# Patient Record
Sex: FEMALE | Race: WHITE | NOT HISPANIC OR LATINO | Employment: OTHER | URBAN - METROPOLITAN AREA
[De-identification: names, ages, dates, MRNs, and addresses within clinical notes are randomized per-mention and may not be internally consistent; named-entity substitution may affect disease eponyms.]

---

## 2019-03-01 ENCOUNTER — TRANSFERRED RECORDS (OUTPATIENT)
Dept: HEALTH INFORMATION MANAGEMENT | Facility: CLINIC | Age: 64
End: 2019-03-01

## 2019-04-24 ENCOUNTER — TRANSFERRED RECORDS (OUTPATIENT)
Dept: HEALTH INFORMATION MANAGEMENT | Facility: CLINIC | Age: 64
End: 2019-04-24

## 2019-12-20 ENCOUNTER — TRANSFERRED RECORDS (OUTPATIENT)
Dept: HEALTH INFORMATION MANAGEMENT | Facility: CLINIC | Age: 64
End: 2019-12-20

## 2019-12-20 LAB
ALT SERPL-CCNC: 21 IU/L (ref 8–45)
AST SERPL-CCNC: 31 IU/L (ref 2–40)
CREAT SERPL-MCNC: 1.09 MG/DL (ref 0.57–1.11)
GFR SERPL CREATININE-BSD FRML MDRD: 51 ML/MIN/1.73M2
GLUCOSE SERPL-MCNC: 75 MG/DL (ref 65–100)
POTASSIUM SERPL-SCNC: 4.8 MMOL/L (ref 3.5–5)
TSH SERPL-ACNC: 0.77 UIU/ML (ref 0.35–4.94)

## 2019-12-27 ENCOUNTER — TRANSFERRED RECORDS (OUTPATIENT)
Dept: HEALTH INFORMATION MANAGEMENT | Facility: CLINIC | Age: 64
End: 2019-12-27

## 2019-12-31 ENCOUNTER — TRANSFERRED RECORDS (OUTPATIENT)
Dept: HEALTH INFORMATION MANAGEMENT | Facility: CLINIC | Age: 64
End: 2019-12-31

## 2020-01-28 ENCOUNTER — TRANSFERRED RECORDS (OUTPATIENT)
Dept: HEALTH INFORMATION MANAGEMENT | Facility: CLINIC | Age: 65
End: 2020-01-28

## 2020-01-30 ENCOUNTER — TRANSFERRED RECORDS (OUTPATIENT)
Dept: HEALTH INFORMATION MANAGEMENT | Facility: CLINIC | Age: 65
End: 2020-01-30

## 2020-10-02 ENCOUNTER — TRANSFERRED RECORDS (OUTPATIENT)
Dept: HEALTH INFORMATION MANAGEMENT | Facility: CLINIC | Age: 65
End: 2020-10-02

## 2020-12-21 LAB
HEP C HIM: NORMAL
TSH SERPL-ACNC: 0.39 UIU/ML (ref 0.35–4.94)

## 2021-01-07 ENCOUNTER — AMBULATORY - HEALTHEAST (OUTPATIENT)
Dept: ADMINISTRATIVE | Facility: CLINIC | Age: 66
End: 2021-01-07

## 2021-01-07 DIAGNOSIS — M79.10 MYALGIA: ICD-10-CM

## 2021-01-15 ENCOUNTER — TRANSFERRED RECORDS (OUTPATIENT)
Dept: HEALTH INFORMATION MANAGEMENT | Facility: CLINIC | Age: 66
End: 2021-01-15

## 2021-01-25 ENCOUNTER — TRANSCRIBE ORDERS (OUTPATIENT)
Dept: OTHER | Age: 66
End: 2021-01-25

## 2021-01-25 DIAGNOSIS — M79.10 MYALGIA: Primary | ICD-10-CM

## 2021-01-25 DIAGNOSIS — H04.123 DRY EYES: ICD-10-CM

## 2021-01-25 DIAGNOSIS — R68.2 DRY MOUTH: ICD-10-CM

## 2021-02-06 ENCOUNTER — HEALTH MAINTENANCE LETTER (OUTPATIENT)
Age: 66
End: 2021-02-06

## 2021-03-03 ENCOUNTER — VIRTUAL VISIT (OUTPATIENT)
Dept: RHEUMATOLOGY | Facility: CLINIC | Age: 66
End: 2021-03-03
Payer: MEDICARE

## 2021-03-03 DIAGNOSIS — M15.0 PRIMARY OSTEOARTHRITIS INVOLVING MULTIPLE JOINTS: ICD-10-CM

## 2021-03-03 DIAGNOSIS — M19.041 PRIMARY OSTEOARTHRITIS OF BOTH HANDS: ICD-10-CM

## 2021-03-03 DIAGNOSIS — R76.8 CYCLIC CITRULLINATED PEPTIDE (CCP) ANTIBODY POSITIVE: ICD-10-CM

## 2021-03-03 DIAGNOSIS — M19.042 PRIMARY OSTEOARTHRITIS OF BOTH HANDS: ICD-10-CM

## 2021-03-03 DIAGNOSIS — M16.0 PRIMARY OSTEOARTHRITIS OF BOTH HIPS: ICD-10-CM

## 2021-03-03 DIAGNOSIS — M35.00 SICCA SYNDROME (H): Primary | ICD-10-CM

## 2021-03-03 PROCEDURE — 99204 OFFICE O/P NEW MOD 45 MIN: CPT | Mod: 95 | Performed by: STUDENT IN AN ORGANIZED HEALTH CARE EDUCATION/TRAINING PROGRAM

## 2021-03-03 RX ORDER — LEVOTHYROXINE SODIUM 112 UG/1
112 TABLET ORAL
COMMUNITY
Start: 2020-12-23

## 2021-03-03 RX ORDER — CEVIMELINE HYDROCHLORIDE 30 MG/1
30 CAPSULE ORAL 3 TIMES DAILY
Qty: 90 CAPSULE | Refills: 0 | Status: SHIPPED | OUTPATIENT
Start: 2021-03-03 | End: 2021-04-19

## 2021-03-03 NOTE — PATIENT INSTRUCTIONS
Your joint pains seem consistent with primary osteoarthritis.  Likelihood of rheumatoid arthritis is low.  Due to positive anti-CCP antibody I will get x-rays of bilateral hand, wrist and feet.    Due to worsening hip pain x-ray of bilateral hips will be done.  You will most likely need to be seen by orthopedic specialist.    For dry mouth symptoms I will put ENT referral for minor salivary gland lip biopsy    You can try cevimeline 30mg capsule 3-4 times a day on as-needed basis for dry mouth.    Follow-up in 8 weeks.

## 2021-03-03 NOTE — PROGRESS NOTES
Nicolasa is a 65 year old who is being evaluated via a billable video visit.      How would you like to obtain your AVS? MyChart  If the video visit is dropped, the invitation should be resent by: Text to cell phone: [phone]  Will anyone else be joining your video visit? Jenny       Ximena Nguyensor [facility]    Video Start Time: 9:45 AM           Active Problem List:     Patient Active Problem List    Diagnosis Date Noted     Primary osteoarthritis involving multiple joints      Priority: Medium            History of Present Illness:   Nicolasa Vogel is a 65 year old female with PMH of sicca symptoms, Osteoarthritis, meningioma, hypothyroidism evaluated via a billable virtual visit in consultation at request of Dr. Josselin Cruz for evaluation of joint pains and sicca symptoms.     She has pain in her joints for more than 10 years.  Sometimes they come as waves when she has more pain in the joints for couple weeks and then it improves.  Mostly involves hips, low back, thighs.  She was evaluated by rheumatologist Dr. Fung in 2014 and then by Dr. Justice in 2020.  Her autoimmune work-up in the past has shown negative SARAY, JORGE LUIS panel, rheumatoid serologies.  Last year her anti-CCP antibody was elevated.  But as per previous rheumatologist's note no evidence of synovitis was noted over her joints.  Mostly degenerative changes were noted.  She has done physical therapy for hip and low back pain.  She tries to be active and walk 4 to 5 miles a day.    Right index finger DIP joint, thumb IP joint hurt. She does not knit any more. Heberden nodes present over the DIP joint. Right wrist ROM is limited. No pain in her elbows, shoulders, knees, ankles. Pain in her big toes, lower back.     Her other main concern is sicca symptoms.  She has dry eyes dry mouth since 2013 and has developed tooth decay secondary to dry mouth.  She follows up with a dentist and ophthalmologist to manage sicca symptoms. Dry eyes have become worse since last  fall. When she wake up she put drops in her eyes.  Her PCP wonder about Sjogren's and RA.     She has noticed that her scalp hurts when washing her hair. She has more bloating, constipation. She has chronic allergies. Her sister has Raynaud's, niece has Lupus. Dad had wegener's    No history of psoriasis, ulcerative colitis or chron's disease. No h/o iritis, enthesitis, finger or toe swelling like dactylitis, plantar fascitis or heel pain. Denies any raynauds, malar rash, photosensitivity, recurrent mouth/genital ulcers, pleuritic chest pains, recurrent sinusitis/rhinitis, swallowing difficulty, hearing or visual changes recently.  No h/o arterial/venous thrombosis in the past. Denies any tick bite, recent GI/ infection.             Review of Systems:     Review Of Systems  Constitutional: denies fever, chills, night sweats and weight loss.  Skin: No skin rash.  Eyes: + dryness or irritation in eyes. No episode of eye inflammation or redness.   Ears/Nose/Throat: no recurrent sinus infections.  Respiratory: No shortness of breath, dyspnea on exertion, cough, or hemoptysis  Cardiovascular: no chest pain or palpitations.  Gastrointestinal: no nausea, vomiting, abdominal pain.  Normal bowel movements.  Genitourinary: no dysuria, frequency  or hematuria.  Musculoskeletal: as in HPI  Neurologic: no numbness, tingling.  Psychiatric: no mood disorders.  Hematologic/Lymphatic/Immunologic: no history of easy bruising, petechia or purpura.  No abnormal bleeding.   Endocrine: +  h/o thyroid disease or Diabetes.                  Past Medical History:     Past Medical History:   Diagnosis Date     Primary osteoarthritis involving multiple joints      Sicca syndrome (H)      Past Surgical History:   Procedure Laterality Date     CHOLECYSTECTOMY       HYSTERECTOMY TOTAL ABDOMINAL              Social History:     Social History     Occupational History     Not on file   Tobacco Use     Smoking status: Not on file   Substance and  Sexual Activity     Alcohol use: Not on file     Drug use: Not on file     Sexual activity: Not on file            Family History:     Family History   Problem Relation Age of Onset     Vasculitis Father      Raynaud syndrome Sister      Lupus Niece             Allergies:     Allergies   Allergen Reactions     Diclofenac Shortness Of Breath     After using gel on joints of hands she became SOB with lightheadedness     Penicillins Hives     Tetracycline Hives            Medications:     Current Outpatient Medications   Medication Sig Dispense Refill     Calcium-Magnesium-Vitamin D (CALCIUM 1200+D3 PO)        cevimeline (EVOXAC) 30 MG capsule Take 1 capsule (30 mg) by mouth 3 times daily 90 capsule 0     levothyroxine (SYNTHROID) 112 MCG tablet Take 112 mcg by mouth       MAGNESIUM CITRATE PO        Multiple Vitamin (MULTIVITAMIN ADULT PO)        Omega-3 Fatty Acids (FISH OIL) 875 MG CAPS        Turmeric (QC TUMERIC COMPLEX PO)               Physical Exam:   Vitals not taken.   Wt Readings from Last 4 Encounters:   No data found for Wt       Constitutional: Moderately built, appearing stated age; cooperative  MS: Right index finger DIP joint, thumb IP joint hurt. Heberden nodes over the DIP joint. R index, middle PIP giovana node. Right wrist, CMC joint hurt. Right wrist ROM is limited. No pain in her elbows, shoulders, knees, ankles. Pain in her big toes, lower back.     Psych: nl judgement, orientation, memory, affect.         Data:     No results found for any visits on 03/03/21.    No results for input(s): WBC, RBC, HGB, HCT, MCV, RDW, PLT, CCPABG, ALBUMIN, CRP, BUN, CREAT in the last 52521 hours.    Invalid input(s): MCT,  AST,  ALT,  ESR   Recent Labs   Lab Test 12/21/20 12/20/19   TSH 0.39 0.77     Hemoglobin   Date Value Ref Range Status   03/24/2021 14.5 11.7 - 15.7 g/dL Final     Sed Rate   Date Value Ref Range Status   03/24/2021 27 0 - 30 mm/h Final     CRP Inflammation   Date Value Ref Range Status    03/24/2021 3.4 0.0 - 8.0 mg/L Final     AST   Date Value Ref Range Status   03/24/2021 32 0 - 45 U/L Final   12/20/2019 31 2 - 40 IU/L Final     Albumin   Date Value Ref Range Status   03/24/2021 4.0 3.4 - 5.0 g/dL Final     ALT   Date Value Ref Range Status   03/24/2021 47 0 - 50 U/L Final   12/20/2019 21 8 - 45 IU/L Final     Rheumatoid Factor   Date Value Ref Range Status   03/24/2021 <7 <12 IU/mL Final     Recent Labs   Lab Test 03/24/21  1016 12/21/20 12/20/19   WBC 8.5  --   --    HGB 14.5  --   --    HCT 44.6  --   --    MCV 91  --   --      --   --    TSH  --  0.39 0.77   AST 32  --  31   ALT 47  --  21     Out side labs :     MRI L spine 12/2019  IMPRESSION:  1. L4-L5 right foraminal/extraforaminal disc extrusion displaces and likely  impinges the exiting right L4 spinal nerve.  2. Multilevel lumbar spondylosis.  3. Moderate right and mild to moderate left neural foraminal stenosis at L4-L5.  4. Mild to moderate spinal canal stenosis at L4-L5. Mild spinal canal stenosis  at L3-L4.      EXAM: XR PELVIS 1 VIEW AP AND HIP 2 VIEW BILATERAL    INDICATION: Chronic Midline Low Back Pain With Right-sided Sciatica Chronic  Midline Low Back Pain With Right-sided Sciatica  COMPARISON: None.    FINDINGS: Mild degenerative osteoarthritis of the right femoral acetabular joint  with formation of small marginal osteophytes. Left femoral acetabular joint is  intact. No acute fracture or dislocation.  Reviewed Rheumatology lab flowsheet    Assessment     Sicca symptoms  Polyarticular osteoarthritis  History of positive anti-CCP antibody - 61 - 12/2020  Negative rheumatoid factor, normal ESR, CRP, negative SARAY    Polyarticular arthritis: She reports pain in her joints mostly in lower back, hips, wrists from last 10 years.  MRI of the lumbar spine in 12/19 has shown multilevel degenerative disc disease.  X-ray of her hands, hips in the past has shown mostly degenerative changes.  On virtual exam she has evidence of  Heberden nodes over PIP DIP joints and reports tenderness over the first CMC joint.  Right wrist range of motion is limited.  Her virtual exam and clinical history is consistent with degenerative arthritis.  Likelihood of rheumatoid arthritis is low.  But due to positive anti-CCP antibody last year I will repeat her rheumatoid serologies and get x-rays of bilateral hand, wrist and bilateral hips.    If mostly degenerative arthritis is seen on the imaging then she need to manage it with as needed Tylenol arthritis or NSAIDs, physical therapy, strength training.    Severe sicca symptoms:  she has sicca symptoms since 2013.  Gradually getting worse.  She has developed tooth decay dry mouth.  Her Sjogren serologies in the past have been negative. I will repeat SARAY, JORGE LUIS panel.  Due to severity of sicca symptoms I will get minor salivary gland lip biopsy to confirm seronegative Sjogren's.  I will start her on cholinergic agonists like cevimeline to help with sicca symptoms.  Side effects of cevimeline including increased flushing, sweating, bloating, cramping, blurry vision discussed with the patient.    Plan     Your joint pains seem consistent with primary osteoarthritis.  Likelihood of rheumatoid arthritis is low.  Due to positive anti-CCP antibody I will get x-rays of bilateral hand, wrist and feet.    Due to worsening hip pain x-ray of bilateral hips will be done.  You will most likely need to be seen by orthopedic specialist.    For dry mouth symptoms I will put ENT referral for minor salivary gland lip biopsy    You can try cevimeline 30mg capsule 3-4 times a day on as-needed basis for dry mouth.    Follow-up in 8 weeks.      Video-Visit Details    Type of service:  Video Visit    Video End Time: 10:30 AM     Originating Location (pt. Location): Home    Distant Location (provider location):  [facility]     Platform used for Video Visit: Bulb

## 2021-03-24 ENCOUNTER — ANCILLARY PROCEDURE (OUTPATIENT)
Dept: GENERAL RADIOLOGY | Facility: CLINIC | Age: 66
End: 2021-03-24
Attending: STUDENT IN AN ORGANIZED HEALTH CARE EDUCATION/TRAINING PROGRAM
Payer: MEDICARE

## 2021-03-24 DIAGNOSIS — M35.00 SICCA SYNDROME (H): ICD-10-CM

## 2021-03-24 DIAGNOSIS — M19.041 PRIMARY OSTEOARTHRITIS OF BOTH HANDS: ICD-10-CM

## 2021-03-24 DIAGNOSIS — M16.0 PRIMARY OSTEOARTHRITIS OF BOTH HIPS: ICD-10-CM

## 2021-03-24 DIAGNOSIS — M19.042 PRIMARY OSTEOARTHRITIS OF BOTH HANDS: ICD-10-CM

## 2021-03-24 DIAGNOSIS — R76.8 CYCLIC CITRULLINATED PEPTIDE (CCP) ANTIBODY POSITIVE: ICD-10-CM

## 2021-03-24 LAB
ALBUMIN SERPL-MCNC: 4 G/DL (ref 3.4–5)
ALBUMIN UR-MCNC: NEGATIVE MG/DL
ALT SERPL W P-5'-P-CCNC: 47 U/L (ref 0–50)
AMORPH CRY #/AREA URNS HPF: ABNORMAL /HPF
APPEARANCE UR: CLEAR
AST SERPL W P-5'-P-CCNC: 32 U/L (ref 0–45)
BACTERIA #/AREA URNS HPF: ABNORMAL /HPF
BILIRUB UR QL STRIP: NEGATIVE
COLOR UR AUTO: YELLOW
CREAT SERPL-MCNC: 0.84 MG/DL (ref 0.52–1.04)
CREAT UR-MCNC: 69 MG/DL
CRP SERPL-MCNC: 3.4 MG/L (ref 0–8)
ERYTHROCYTE [DISTWIDTH] IN BLOOD BY AUTOMATED COUNT: 13.1 % (ref 10–15)
ERYTHROCYTE [SEDIMENTATION RATE] IN BLOOD BY WESTERGREN METHOD: 27 MM/H (ref 0–30)
GFR SERPL CREATININE-BSD FRML MDRD: 72 ML/MIN/{1.73_M2}
GLUCOSE UR STRIP-MCNC: NEGATIVE MG/DL
HCT VFR BLD AUTO: 44.6 % (ref 35–47)
HGB BLD-MCNC: 14.5 G/DL (ref 11.7–15.7)
HGB UR QL STRIP: NEGATIVE
KETONES UR STRIP-MCNC: NEGATIVE MG/DL
LEUKOCYTE ESTERASE UR QL STRIP: NEGATIVE
MCH RBC QN AUTO: 29.4 PG (ref 26.5–33)
MCHC RBC AUTO-ENTMCNC: 32.5 G/DL (ref 31.5–36.5)
MCV RBC AUTO: 91 FL (ref 78–100)
NITRATE UR QL: NEGATIVE
NON-SQ EPI CELLS #/AREA URNS LPF: ABNORMAL /LPF
PH UR STRIP: 6.5 PH (ref 5–7)
PLATELET # BLD AUTO: 324 10E9/L (ref 150–450)
PROT UR-MCNC: 0.1 G/L
PROT/CREAT 24H UR: 0.14 G/G CR (ref 0–0.2)
RBC # BLD AUTO: 4.93 10E12/L (ref 3.8–5.2)
RBC #/AREA URNS AUTO: ABNORMAL /HPF
RHEUMATOID FACT SER NEPH-ACNC: <7 IU/ML (ref 0–20)
SOURCE: ABNORMAL
SP GR UR STRIP: 1.02 (ref 1–1.03)
UROBILINOGEN UR STRIP-MCNC: NORMAL MG/DL (ref 0–2)
WBC # BLD AUTO: 8.5 10E9/L (ref 4–11)
WBC #/AREA URNS AUTO: ABNORMAL /HPF

## 2021-03-24 PROCEDURE — 86038 ANTINUCLEAR ANTIBODIES: CPT | Performed by: STUDENT IN AN ORGANIZED HEALTH CARE EDUCATION/TRAINING PROGRAM

## 2021-03-24 PROCEDURE — 84450 TRANSFERASE (AST) (SGOT): CPT | Performed by: STUDENT IN AN ORGANIZED HEALTH CARE EDUCATION/TRAINING PROGRAM

## 2021-03-24 PROCEDURE — 85652 RBC SED RATE AUTOMATED: CPT | Performed by: STUDENT IN AN ORGANIZED HEALTH CARE EDUCATION/TRAINING PROGRAM

## 2021-03-24 PROCEDURE — 86431 RHEUMATOID FACTOR QUANT: CPT | Performed by: STUDENT IN AN ORGANIZED HEALTH CARE EDUCATION/TRAINING PROGRAM

## 2021-03-24 PROCEDURE — 81001 URINALYSIS AUTO W/SCOPE: CPT | Performed by: STUDENT IN AN ORGANIZED HEALTH CARE EDUCATION/TRAINING PROGRAM

## 2021-03-24 PROCEDURE — 73110 X-RAY EXAM OF WRIST: CPT | Mod: RT | Performed by: RADIOLOGY

## 2021-03-24 PROCEDURE — 84156 ASSAY OF PROTEIN URINE: CPT | Performed by: STUDENT IN AN ORGANIZED HEALTH CARE EDUCATION/TRAINING PROGRAM

## 2021-03-24 PROCEDURE — 86140 C-REACTIVE PROTEIN: CPT | Performed by: STUDENT IN AN ORGANIZED HEALTH CARE EDUCATION/TRAINING PROGRAM

## 2021-03-24 PROCEDURE — 86235 NUCLEAR ANTIGEN ANTIBODY: CPT | Performed by: STUDENT IN AN ORGANIZED HEALTH CARE EDUCATION/TRAINING PROGRAM

## 2021-03-24 PROCEDURE — 85027 COMPLETE CBC AUTOMATED: CPT | Performed by: STUDENT IN AN ORGANIZED HEALTH CARE EDUCATION/TRAINING PROGRAM

## 2021-03-24 PROCEDURE — 82040 ASSAY OF SERUM ALBUMIN: CPT | Performed by: STUDENT IN AN ORGANIZED HEALTH CARE EDUCATION/TRAINING PROGRAM

## 2021-03-24 PROCEDURE — 86200 CCP ANTIBODY: CPT | Performed by: STUDENT IN AN ORGANIZED HEALTH CARE EDUCATION/TRAINING PROGRAM

## 2021-03-24 PROCEDURE — 36415 COLL VENOUS BLD VENIPUNCTURE: CPT | Performed by: STUDENT IN AN ORGANIZED HEALTH CARE EDUCATION/TRAINING PROGRAM

## 2021-03-24 PROCEDURE — 72170 X-RAY EXAM OF PELVIS: CPT | Performed by: RADIOLOGY

## 2021-03-24 PROCEDURE — 73130 X-RAY EXAM OF HAND: CPT | Mod: LT | Performed by: RADIOLOGY

## 2021-03-24 PROCEDURE — 84460 ALANINE AMINO (ALT) (SGPT): CPT | Performed by: STUDENT IN AN ORGANIZED HEALTH CARE EDUCATION/TRAINING PROGRAM

## 2021-03-24 PROCEDURE — 82565 ASSAY OF CREATININE: CPT | Performed by: STUDENT IN AN ORGANIZED HEALTH CARE EDUCATION/TRAINING PROGRAM

## 2021-03-25 LAB
ANA SER QL IF: NEGATIVE
CCP AB SER IA-ACNC: 1 U/ML
ENA RNP IGG SER IA-ACNC: <0.2 AI (ref 0–0.9)
ENA SM IGG SER-ACNC: <0.2 AI (ref 0–0.9)
ENA SS-A IGG SER IA-ACNC: 0.4 AI (ref 0–0.9)
ENA SS-B IGG SER IA-ACNC: <0.2 AI (ref 0–0.9)

## 2021-03-26 ENCOUNTER — OFFICE VISIT (OUTPATIENT)
Dept: OTOLARYNGOLOGY | Facility: CLINIC | Age: 66
End: 2021-03-26
Attending: STUDENT IN AN ORGANIZED HEALTH CARE EDUCATION/TRAINING PROGRAM
Payer: MEDICARE

## 2021-03-26 VITALS — SYSTOLIC BLOOD PRESSURE: 113 MMHG | HEART RATE: 78 BPM | DIASTOLIC BLOOD PRESSURE: 76 MMHG

## 2021-03-26 DIAGNOSIS — M35.00 SICCA SYNDROME (H): Primary | ICD-10-CM

## 2021-03-26 DIAGNOSIS — M35.01 SJOGREN'S SYNDROME WITH KERATOCONJUNCTIVITIS SICCA (H): ICD-10-CM

## 2021-03-26 PROCEDURE — 42405 BIOPSY OF SALIVARY GLAND: CPT | Performed by: OTOLARYNGOLOGY

## 2021-03-26 PROCEDURE — 99203 OFFICE O/P NEW LOW 30 MIN: CPT | Mod: 25 | Performed by: OTOLARYNGOLOGY

## 2021-03-26 PROCEDURE — 88305 TISSUE EXAM BY PATHOLOGIST: CPT | Performed by: PATHOLOGY

## 2021-03-26 ASSESSMENT — ENCOUNTER SYMPTOMS
TINGLING: 0
HEADACHES: 0
VOMITING: 0
BLURRED VISION: 1
DIZZINESS: 0
HEMOPTYSIS: 0
HEARTBURN: 0
PHOTOPHOBIA: 0
BRUISES/BLEEDS EASILY: 0
DOUBLE VISION: 0
SPUTUM PRODUCTION: 0
NAUSEA: 0
COUGH: 0
CONSTITUTIONAL NEGATIVE: 1

## 2021-03-26 ASSESSMENT — PAIN SCALES - GENERAL: PAINLEVEL: NO PAIN (0)

## 2021-03-26 NOTE — PROGRESS NOTES
HPI    This is a 65 year old pleasant patient with PMH of sicca symptoms, dry mouth, dry eyes, osteoarthritis and teeth decay. She follows with Dentist and ophthalmologist to manage sicca symptoms. But sicca symptoms are still significant.    She also has pain in her hips, back, thighs. Hair was falling off, joint pains, drink water then overheat. Denies any odynophagia, voice changes, dysphagia, weight changes, or night sweat.  She has a hx of hypothyroidism, RA and Sjogren's. She wears HAs.     Medication allergies:   PenicillinsHives   TetracyclineHives     Current Medications:  Calcium-Magnesium-Vitamin D (CALCIUM 1200+D3 PO)  cevimeline (EVOXAC) 30 MG capsule  levothyroxine (SYNTHROID) 112 MCG tablet  MAGNESIUM CITRATE PO  Multiple Vitamin (MULTIVITAMIN ADULT PO)  Omega-3 Fatty Acids (FISH OIL) 875 MG CAPS  Turmeric (QC TUMERIC COMPLEX PO)     Review of Systems   Constitutional: Negative.    HENT: Positive for hearing loss.    Eyes: Positive for blurred vision. Negative for double vision and photophobia.   Respiratory: Negative for cough, hemoptysis and sputum production.    Gastrointestinal: Negative for heartburn, nausea and vomiting.   Skin: Negative.    Neurological: Negative for dizziness, tingling and headaches.   Endo/Heme/Allergies: Negative for environmental allergies. Does not bruise/bleed easily.         Physical Exam  Vitals signs reviewed.   Constitutional:       Appearance: Normal appearance.   HENT:      Head: Normocephalic and atraumatic.      Right Ear: Tympanic membrane, ear canal and external ear normal. Decreased hearing noted. No middle ear effusion. There is no impacted cerumen.      Left Ear: Tympanic membrane, ear canal and external ear normal. Decreased hearing noted.  No middle ear effusion. There is no impacted cerumen.      Nose: Septal deviation present. No mucosal edema, congestion or rhinorrhea.      Mouth/Throat:      Mouth: Mucous membranes are moist.      Pharynx: Oropharynx is  clear. Uvula midline.   Eyes:      Extraocular Movements: Extraocular movements intact.      Pupils: Pupils are equal, round, and reactive to light.   Neurological:      Mental Status: She is alert.       Minor salivary gland biopsy: Left paramedian lower lip was infiltrated with 0.2 ml of 1% Lidocaine with 1: 100.000 epinephrine. After waiting adequate time, a horizontal incision was made and a couple of minor salivary glands were removed and sent to pathology. The incision site was closed with 4/0 vicryl. She tolerated well.    A/P  This pleasant patient is having Sjogren's syndrome symptoms and a minor salivary gland biopsy was performed. Her questions were answered.

## 2021-03-26 NOTE — LETTER
3/26/2021         RE: Nicolasa Vogel  [address] [facility] [address]        Dear Colleague,    Thank you for referring your patient, Nicolasa Vogel, to the [facility]. Please see a copy of my visit note below.    HPI    This is a 65 year old pleasant patient with PMH of sicca symptoms, dry mouth, dry eyes, osteoarthritis and teeth decay. She follows with Dentist and ophthalmologist to manage sicca symptoms. But sicca symptoms are still significant.    She also has pain in her hips, back, thighs. Hair was falling off, joint pains, drink water then overheat. Denies any odynophagia, voice changes, dysphagia, weight changes, or night sweat.  She has a hx of hypothyroidism, RA and Sjogren's. She wears HAs.     Medication allergies:   PenicillinsHives   TetracyclineHives     Current Medications:  Calcium-Magnesium-Vitamin D (CALCIUM 1200+D3 PO)  cevimeline (EVOXAC) 30 MG capsule  levothyroxine (SYNTHROID) 112 MCG tablet  MAGNESIUM CITRATE PO  Multiple Vitamin (MULTIVITAMIN ADULT PO)  Omega-3 Fatty Acids (FISH OIL) 875 MG CAPS  Turmeric (QC TUMERIC COMPLEX PO)     Review of Systems   Constitutional: Negative.    HENT: Positive for hearing loss.    Eyes: Positive for blurred vision. Negative for double vision and photophobia.   Respiratory: Negative for cough, hemoptysis and sputum production.    Gastrointestinal: Negative for heartburn, nausea and vomiting.   Skin: Negative.    Neurological: Negative for dizziness, tingling and headaches.   Endo/Heme/Allergies: Negative for environmental allergies. Does not bruise/bleed easily.         Physical Exam  Vitals signs reviewed.   Constitutional:       Appearance: Normal appearance.   HENT:      Head: Normocephalic and atraumatic.      Right Ear: Tympanic membrane, ear canal and external ear normal. Decreased hearing noted. No middle ear effusion. There is no impacted cerumen.      Left Ear: Tympanic membrane, ear canal and external ear normal.  Decreased hearing noted.  No middle ear effusion. There is no impacted cerumen.      Nose: Septal deviation present. No mucosal edema, congestion or rhinorrhea.      Mouth/Throat:      Mouth: Mucous membranes are moist.      Pharynx: Oropharynx is clear. Uvula midline.   Eyes:      Extraocular Movements: Extraocular movements intact.      Pupils: Pupils are equal, round, and reactive to light.   Neurological:      Mental Status: She is alert.       Minor salivary gland biopsy: Left paramedian lower lip was infiltrated with 0.2 ml of 1% Lidocaine with 1: 100.000 epinephrine. After waiting adequate time, a horizontal incision was made and a couple of minor salivary glands were removed and sent to pathology. The incision site was closed with 4/0 vicryl. She tolerated well.    A/P  This pleasant patient is having Sjogren's syndrome symptoms and a minor salivary gland biopsy was performed. Her questions were answered.        Again, thank you for allowing me to participate in the care of your patient.        Sincerely,        John Morse MD

## 2021-03-27 DIAGNOSIS — M35.00 SICCA SYNDROME (H): ICD-10-CM

## 2021-03-27 DIAGNOSIS — R76.8 CYCLIC CITRULLINATED PEPTIDE (CCP) ANTIBODY POSITIVE: ICD-10-CM

## 2021-03-30 LAB — COPATH REPORT: NORMAL

## 2021-03-31 RX ORDER — CEVIMELINE HYDROCHLORIDE 30 MG/1
30 CAPSULE ORAL 3 TIMES DAILY
Qty: 90 CAPSULE | Refills: 0 | OUTPATIENT
Start: 2021-03-31

## 2021-03-31 NOTE — TELEPHONE ENCOUNTER
CEVIMELINE HCL 30 MG CAPSULE  cevimeline (EVOXAC) 30 MG capsule 90 capsule 0 3/3/2021  --   Sig - Route: Take 1 capsule (30 mg) by mouth 3 times daily - Oral   Sent to pharmacy as: Cevimeline HCl 30 MG Oral Capsule (EVOXAC)   Class: E-Prescribe   Order: [id number]   E-Prescribing Status: Receipt confirmed by pharmacy (3/3/2021 10:42 AM CST)   Printout Tracking    External Result Report   Pharmacy    [facility] [id number] [facility] - [place], [place] - [facility] [address] [facility]   Associated Diagnoses    Sicca syndrome (H) [M35.00]  - Primary       Cyclic citrullinated peptide (CCP) antibody positive [R79.89]               Ann Corona RN  Central Triage Red Flags/Med Refills

## 2021-04-02 NOTE — RESULT ENCOUNTER NOTE
Martha Baldwin,     X-ray of your hands and wrist has shown osteoarthritis.  No evidence of rheumatoid arthritis noted.    Garcia Chatman MD

## 2021-04-02 NOTE — RESULT ENCOUNTER NOTE
Martha Baldwin,     Autoimmune work-up showed negative SARAY, JORGE LUIS panel, rheumatoid factor, anti-CCP antibody.  You have normal liver, kidney function test.  Urine analysis did not show any protein loss.    Garcia Chatman MD

## 2021-04-15 DIAGNOSIS — M35.00 SICCA SYNDROME (H): ICD-10-CM

## 2021-04-15 DIAGNOSIS — R76.8 CYCLIC CITRULLINATED PEPTIDE (CCP) ANTIBODY POSITIVE: ICD-10-CM

## 2021-04-19 NOTE — TELEPHONE ENCOUNTER
cevimeline (EVOXAC) 30 MG capsule  Last Written Prescription Date:  3/3/21  Last Fill Quantity: 90,   # refills: 0  Last Office Visit : 3/3/21  Future Office visit:  4/20/21    Routing refill request to provider for review/approval because: last fill #90. rf?

## 2021-04-20 ENCOUNTER — VIRTUAL VISIT (OUTPATIENT)
Dept: RHEUMATOLOGY | Facility: CLINIC | Age: 66
End: 2021-04-20
Payer: MEDICARE

## 2021-04-20 DIAGNOSIS — M35.00 SICCA SYNDROME (H): ICD-10-CM

## 2021-04-20 DIAGNOSIS — M16.0 PRIMARY OSTEOARTHRITIS OF BOTH HIPS: ICD-10-CM

## 2021-04-20 DIAGNOSIS — M19.042 PRIMARY OSTEOARTHRITIS OF BOTH HANDS: Primary | ICD-10-CM

## 2021-04-20 DIAGNOSIS — M19.041 PRIMARY OSTEOARTHRITIS OF BOTH HANDS: Primary | ICD-10-CM

## 2021-04-20 DIAGNOSIS — R76.8 CYCLIC CITRULLINATED PEPTIDE (CCP) ANTIBODY POSITIVE: ICD-10-CM

## 2021-04-20 DIAGNOSIS — M35.00 SJOGREN'S SYNDROME, WITH UNSPECIFIED ORGAN INVOLVEMENT (H): ICD-10-CM

## 2021-04-20 PROCEDURE — 99213 OFFICE O/P EST LOW 20 MIN: CPT | Mod: 95 | Performed by: STUDENT IN AN ORGANIZED HEALTH CARE EDUCATION/TRAINING PROGRAM

## 2021-04-20 RX ORDER — CEVIMELINE HYDROCHLORIDE 30 MG/1
30 CAPSULE ORAL 3 TIMES DAILY
Qty: 90 CAPSULE | Refills: 2 | Status: SHIPPED | OUTPATIENT
Start: 2021-04-20 | End: 2021-07-19

## 2021-04-20 RX ORDER — POLYETHYLENE GLYCOL 400 AND PROPYLENE GLYCOL 4; 3 MG/ML; MG/ML
1 GEL OPHTHALMIC DAILY
COMMUNITY

## 2021-04-20 RX ORDER — CEVIMELINE HYDROCHLORIDE 30 MG/1
30 CAPSULE ORAL 3 TIMES DAILY
Qty: 90 CAPSULE | Refills: 2 | Status: SHIPPED | OUTPATIENT
Start: 2021-04-20 | End: 2021-04-20

## 2021-04-20 NOTE — PROGRESS NOTES
Nicolasa is a 65 year old who is being evaluated via a billable video visit.      Video visit - patient will login to my chart. If patient is not logged into my chart at 11:30, please email invite to: [email or web address]    How would you like to obtain your AVS? MyChart  If the video visit is dropped, the invitation should be resent by: Send to e-mail at: [email or web address]  Will anyone else be joining your video visit? No        Allan Hollis LPN  Pulmonary Medicine:  [facility]  Phone: [phone] Fax: [phone]      Video Start Time: 11:28 AM            Active Problem List:     Patient Active Problem List    Diagnosis Date Noted     Primary osteoarthritis involving multiple joints      Priority: Medium            History of Present Illness:   Nicolasa Vogel is a 65 year old female with PMH of sicca symptoms, Osteoarthritis, meningioma, hypothyroidism evaluated via a billable virtual visit in consultation at request of Dr. Josselin Cruz for evaluation of joint pains and sicca symptoms.     She has pain in her joints for more than 10 years.  Sometimes they come as waves when she has more pain in the joints for couple weeks and then it improves.  Mostly involves hips, low back, thighs.  She was evaluated by rheumatologist Dr. Fung in 2014 and then by Dr. Justice in 2020.  Her autoimmune work-up in the past has shown negative SARAY, JORGE LUIS panel, rheumatoid serologies.  Last year her anti-CCP antibody was elevated.  But as per previous rheumatologist's note no evidence of synovitis was noted over her joints.  Mostly degenerative changes were noted.  She has done physical therapy for hip and low back pain.  She tries to be active and walk 4 to 5 miles a day.    Right index finger DIP joint, thumb IP joint hurt. She does not knit any more. Heberden nodes present over the DIP joint. Right wrist ROM is limited. No pain in her elbows, shoulders, knees, ankles. Pain in her big toes, lower  back.     Her other main concern is sicca symptoms.  She has dry eyes dry mouth since 2013 and has developed tooth decay secondary to dry mouth.  She follows up with a dentist and ophthalmologist to manage sicca symptoms. Dry eyes have become worse since last fall. When she wake up she put drops in her eyes.  Her PCP wonder about Sjogren's and RA.     She has noticed that her scalp hurts when washing her hair. She has more bloating, constipation. She has chronic allergies. Her sister has Raynaud's, niece has Lupus. Dad had wegener's    No history of psoriasis, ulcerative colitis or chron's disease. No h/o iritis, enthesitis, finger or toe swelling like dactylitis, plantar fascitis or heel pain. Denies any raynauds, malar rash, photosensitivity, recurrent mouth/genital ulcers, pleuritic chest pains, recurrent sinusitis/rhinitis, swallowing difficulty, hearing or visual changes recently.  No h/o arterial/venous thrombosis in the past. Denies any tick bite, recent GI/ infection.     April 20, 2021 - She has tried Cevimeline and that has helped her significantly with sicca symptoms. She use gel at night for her eyes which has helped with her eyes. No new abrasion or infection in her eyes. Hands, back and hips have pain. She walk everyday. She has pain in her back, MRI of L spine showed DDD. She has seen Orthopedics at [facility] and was recommended to do PT. She gets pain in her DIP joints, right wrist pain. Both hips hurt, right hip more during the day. Denies any pain in her knees, feet. Minor salivary gland lip biopsy done on 3/26/21 showed 2 focus of lymphoplasmacytic infiltrate of 50 lymphocyte along with chronic fibrosis. X ray of Hands, wrists mostly showed degenerative arthritis. Autoimmune work up done on 3/24/2021 showed negative SARAY, JORGE LUIS panel, rheumatoid factor, anti-CCP, normal CBC, CMP, ESR, CRP.            Review of Systems:     Review Of Systems  Constitutional: denies fever, chills, night  sweats and weight loss.  Skin: No skin rash.  Eyes: + dryness or irritation in eyes. No episode of eye inflammation or redness.   Ears/Nose/Throat: no recurrent sinus infections.  Respiratory: No shortness of breath, dyspnea on exertion, cough, or hemoptysis  Cardiovascular: no chest pain or palpitations.  Gastrointestinal: no nausea, vomiting, abdominal pain.  Normal bowel movements.  Genitourinary: no dysuria, frequency  or hematuria.  Musculoskeletal: as in HPI  Neurologic: no numbness, tingling.  Psychiatric: no mood disorders.  Hematologic/Lymphatic/Immunologic: no history of easy bruising, petechia or purpura.  No abnormal bleeding.   Endocrine: +  h/o thyroid disease or Diabetes.                  Past Medical History:     Past Medical History:   Diagnosis Date     Primary osteoarthritis involving multiple joints      Sicca syndrome (H)      Past Surgical History:   Procedure Laterality Date     CHOLECYSTECTOMY       HYSTERECTOMY TOTAL ABDOMINAL              Social History:     Social History     Occupational History     Not on file   Tobacco Use     Smoking status: Not on file   Substance and Sexual Activity     Alcohol use: Not on file     Drug use: Not on file     Sexual activity: Not on file            Family History:     Family History   Problem Relation Age of Onset     Vasculitis Father      Raynaud syndrome Sister      Lupus Niece             Allergies:     Allergies   Allergen Reactions     Diclofenac Shortness Of Breath     After using gel on joints of hands she became SOB with lightheadedness     Penicillins Hives     Tetracycline Hives            Medications:     Current Outpatient Medications   Medication Sig Dispense Refill     Calcium-Magnesium-Vitamin D (CALCIUM 1200+D3 PO)        cevimeline (EVOXAC) 30 MG capsule Take 1 capsule (30 mg) by mouth 3 times daily 90 capsule 0     levothyroxine (SYNTHROID) 112 MCG tablet Take 112 mcg by mouth       MAGNESIUM CITRATE PO        Multiple Vitamin  (MULTIVITAMIN ADULT PO)        Omega-3 Fatty Acids (FISH OIL) 875 MG CAPS        polyethyl glycol-propyl glycol (SYSTANE) 0.4-0.3 % GEL        Turmeric (QC TUMERIC COMPLEX PO)               Physical Exam:   Vitals not taken.   Wt Readings from Last 4 Encounters:   No data found for Wt       Constitutional: Moderately built, appearing stated age; cooperative  MS: Right index finger DIP joint, thumb IP joint hurt. Heberden nodes over the DIP joint. R index, middle PIP giovana node. Right wrist, CMC joint hurt. Right wrist ROM is limited. No pain in her elbows, shoulders, knees, ankles. Pain in her big toes, lower back.     Psych: nl judgement, orientation, memory, affect.         Data:     No results found for any visits on 04/20/21.    Recent Labs   Lab Test 12/21/20 12/20/19   TSH 0.39 0.77     Hemoglobin   Date Value Ref Range Status   03/24/2021 14.5 11.7 - 15.7 g/dL Final     Sed Rate   Date Value Ref Range Status   03/24/2021 27 0 - 30 mm/h Final     CRP Inflammation   Date Value Ref Range Status   03/24/2021 3.4 0.0 - 8.0 mg/L Final     AST   Date Value Ref Range Status   03/24/2021 32 0 - 45 U/L Final   12/20/2019 31 2 - 40 IU/L Final     Albumin   Date Value Ref Range Status   03/24/2021 4.0 3.4 - 5.0 g/dL Final     ALT   Date Value Ref Range Status   03/24/2021 47 0 - 50 U/L Final   12/20/2019 21 8 - 45 IU/L Final     Rheumatoid Factor   Date Value Ref Range Status   03/24/2021 <7 <12 IU/mL Final     Recent Labs   Lab Test 03/24/21  1016 12/21/20 12/20/19   WBC 8.5  --   --    HGB 14.5  --   --    HCT 44.6  --   --    MCV 91  --   --      --   --    TSH  --  0.39 0.77   AST 32  --  31   ALT 47  --  21     Out side labs :     MRI L spine 12/2019  IMPRESSION:  1. L4-L5 right foraminal/extraforaminal disc extrusion displaces and likely  impinges the exiting right L4 spinal nerve.  2. Multilevel lumbar spondylosis.  3. Moderate right and mild to moderate left neural foraminal stenosis at L4-L5.  4. Mild  to moderate spinal canal stenosis at L4-L5. Mild spinal canal stenosis  at L3-L4.      EXAM: XR PELVIS 1 VIEW AP AND HIP 2 VIEW BILATERAL    INDICATION: Chronic Midline Low Back Pain With Right-sided Sciatica Chronic  Midline Low Back Pain With Right-sided Sciatica  COMPARISON: None.    FINDINGS: Mild degenerative osteoarthritis of the right femoral acetabular joint  with formation of small marginal osteophytes. Left femoral acetabular joint is  intact. No acute fracture or dislocation.  Reviewed Rheumatology lab flowsheet    Assessment     Sicca symptoms  Minor salivary and lip biopsy-3/26/2021- 2 foci of lymphoplasmacytic infiltrate with more than 50 cells are noted along with focal fibrosis.  Polyarticular osteoarthritis  History of positive anti-CCP antibody - 61 - 12/2020  Negative rheumatoid factor, normal ESR, CRP, negative SARAY    Severe sicca symptoms:  she has sicca symptoms since 2013.  Gradually getting worse.  She has developed tooth decay dry mouth.  Her Sjogren serologies in the past have been negative.  Repeat autoimmune work-up done in 3/2021 is normal.  But minor salivary gland lip biopsy showed 2 foci of lymphoplasmacytic infiltrate with more than 50 cells suggestive of Sjogren's.  She might have seronegative Sjogren's based on the biopsy.  Severe sicca symptoms have responded to cevimeline.  She has normal CBC, liver, kidney function test and urine analysis.  No evidence of systemic manifestations of Sjogren's disease is noted.  No need of immune suppression.    Polyarticular arthritis: She reports pain in her joints mostly in lower back, hips, wrists for last 10 years.  MRI of the lumbar spine in 12/19 has shown multilevel degenerative disc disease.  X-ray of her hands, hips in the past has shown mostly degenerative changes.  On virtual exam she has evidence of Heberden nodes over PIP DIP joints and reports tenderness over the first CMC joint.  Right wrist range of motion is limited.  Her virtual  exam and clinical history is consistent with degenerative arthritis.  Repeat rheumatoid serologies, inflammatory markers are normal.  X-rays of bilateral hand and wrist done on 3/24/2021 showed polyarticular osteoarthritis.  No evidence of inflammatory arthritis or erosions noted.  Likelihood of rheumatoid arthritis is low.     I will place hand therapy and hip physical therapy referral.  For osteoarthritis she can use Tylenol arthritis 1 to 2 tablets a day along with NSAIDs like ibuprofen 400-600 mg 2-3 times a day on as-needed basis.      Plan     Joint pain related to polyarticular osteoarthritis.    Minor salivary gland lip biopsy did show features raising concern about Sjogren's.  Since you have severe sicca symptoms, you most likely have seronegative Sjogren's.    Seronegative Sjogren's is not causing any systemic manifestations.  No need of immune suppression.    Continue cevimeline 30 mg 3-4 times a day for sicca symptoms    Hand therapy and hip therapy referral placed    Follow-up in 6 months with labs.      Video-Visit Details    Type of service:  Video Visit    Video End Time: 12:00 PM     Originating Location (pt. Location): Home    Distant Location (provider location):  [facility]     Platform used for Video Visit: Thuy Chatman MD  [facility]  Department of Rheumatology & Autoimmune Disorders  [employer] [place]: [phone]  Pager - [phone]

## 2021-04-20 NOTE — PATIENT INSTRUCTIONS
Joint pain related to polyarticular osteoarthritis.    Minor salivary gland lip biopsy did show features raising concern about Sjogren's.  Since you have severe sicca symptoms, you most likely have seronegative Sjogren's.    Seronegative Sjogren's is not causing any systemic manifestations.  No need of immune suppression.    Continue cevimeline 30 mg 3-4 times a day for sicca symptoms    Hand therapy and hip therapy referral placed    Follow-up in 6 months with labs.

## 2021-05-03 ENCOUNTER — THERAPY VISIT (OUTPATIENT)
Dept: OCCUPATIONAL THERAPY | Facility: CLINIC | Age: 66
End: 2021-05-03
Attending: STUDENT IN AN ORGANIZED HEALTH CARE EDUCATION/TRAINING PROGRAM
Payer: MEDICARE

## 2021-05-03 ENCOUNTER — THERAPY VISIT (OUTPATIENT)
Dept: PHYSICAL THERAPY | Facility: CLINIC | Age: 66
End: 2021-05-03
Attending: STUDENT IN AN ORGANIZED HEALTH CARE EDUCATION/TRAINING PROGRAM
Payer: MEDICARE

## 2021-05-03 DIAGNOSIS — M79.641 BILATERAL HAND PAIN: Primary | ICD-10-CM

## 2021-05-03 DIAGNOSIS — M19.042 PRIMARY OSTEOARTHRITIS OF BOTH HANDS: ICD-10-CM

## 2021-05-03 DIAGNOSIS — M79.642 BILATERAL HAND PAIN: Primary | ICD-10-CM

## 2021-05-03 DIAGNOSIS — M16.0 PRIMARY OSTEOARTHRITIS OF BOTH HIPS: ICD-10-CM

## 2021-05-03 DIAGNOSIS — M19.041 PRIMARY OSTEOARTHRITIS OF BOTH HANDS: ICD-10-CM

## 2021-05-03 DIAGNOSIS — M25.551 BILATERAL HIP PAIN: Primary | ICD-10-CM

## 2021-05-03 DIAGNOSIS — M25.552 BILATERAL HIP PAIN: Primary | ICD-10-CM

## 2021-05-03 PROCEDURE — 97162 PT EVAL MOD COMPLEX 30 MIN: CPT | Mod: GP | Performed by: PHYSICAL THERAPIST

## 2021-05-03 PROCEDURE — 97760 ORTHOTIC MGMT&TRAING 1ST ENC: CPT | Mod: GO | Performed by: OCCUPATIONAL THERAPIST

## 2021-05-03 PROCEDURE — 97110 THERAPEUTIC EXERCISES: CPT | Mod: GO | Performed by: OCCUPATIONAL THERAPIST

## 2021-05-03 PROCEDURE — 97110 THERAPEUTIC EXERCISES: CPT | Mod: GP | Performed by: PHYSICAL THERAPIST

## 2021-05-03 PROCEDURE — 97165 OT EVAL LOW COMPLEX 30 MIN: CPT | Mod: GO | Performed by: OCCUPATIONAL THERAPIST

## 2021-05-03 ASSESSMENT — ACTIVITIES OF DAILY LIVING (ADL)
WALKING_DOWN_STEEP_HILLS: NO DIFFICULTY AT ALL
STEPPING_UP_AND_DOWN_CURBS: NO DIFFICULTY AT ALL
HOS_ADL_SCORE(%): 78.12
HOW_WOULD_YOU_RATE_YOUR_CURRENT_LEVEL_OF_FUNCTION_DURING_YOUR_USUAL_ACTIVITIES_OF_DAILY_LIVING_FROM_0_TO_100_WITH_100_BEING_YOUR_LEVEL_OF_FUNCTION_PRIOR_TO_YOUR_HIP_PROBLEM_AND_0_BEING_THE_INABILITY_TO_PERFORM_ANY_OF_YOUR_USUAL_DAILY_ACTIVITIES?: 80
TWISTING/PIVOTING_ON_INVOLVED_LEG: NO DIFFICULTY AT ALL
WALKING_INITIALLY: MODERATE DIFFICULTY
GOING_UP_1_FLIGHT_OF_STAIRS: SLIGHT DIFFICULTY
WALKING_UP_STEEP_HILLS: SLIGHT DIFFICULTY
PUTTING_ON_SOCKS_AND_SHOES: NO DIFFICULTY AT ALL
WALKING_15_MINUTES_OR_GREATER: MODERATE DIFFICULTY
GOING_DOWN_1_FLIGHT_OF_STAIRS: NO DIFFICULTY AT ALL
ROLLING_OVER_IN_BED: MODERATE DIFFICULTY
WALKING_APPROXIMATELY_10_MINUTES: NO DIFFICULTY AT ALL
HOS_ADL_HIGHEST_POTENTIAL_SCORE: 64
LIGHT_TO_MODERATE_WORK: SLIGHT DIFFICULTY
DEEP_SQUATTING: NO DIFFICULTY AT ALL
HEAVY_WORK: MODERATE DIFFICULTY
SITTING_FOR_15_MINUTES: MODERATE DIFFICULTY
RECREATIONAL_ACTIVITIES: MODERATE DIFFICULTY
GETTING_INTO_AND_OUT_OF_AN_AVERAGE_CAR: SLIGHT DIFFICULTY
HOS_ADL_COUNT: 16
HOS_ADL_ITEM_SCORE_TOTAL: 50
STANDING_FOR_15_MINUTES: NO DIFFICULTY AT ALL

## 2021-05-03 NOTE — PROGRESS NOTES
"  HPI                  PHYSICAL THERAPY INITIAL EVALUATION    Precautions/Restrictions/MD instructions:  Evaluate & Treat - Hip Pain    Therapist Assessment: Nicolasa Vogel is a 65 year old female who presents to Physical Therapy with R>L hip and LBP.  Patient demonstrates normal hip ROM both actively and passively, hip weakness, and significant mobility loss into lumbar extension. Signs and symptoms suggest mechanical LBP as primary source of pain.   These impairments limit her ability to sit comfortably, perform sit to stand transitions, and sleep.  Skilled PT services are necessary in order to reduce impairments and improve independent function.     SUBJECTIVE:  Chief Complaint: R>L hip/LB pain  Onset Date:  Approximately 8 yrs ago      MD referral date: 4/20/21    DOS: NA  Mechanism of Injury:  Insidious onset; history of 4 falls in the past 1.5 years  (cross country skiing, slipping on the ice, going down basement steps)  New/Recurrent/Chronic:  chronic  Pain Location: right > left buttock and right LB described as intermittent dull ache and rated as 3/10  Associated Symptoms: occasional numbness (right leg \"went completely dead\" recently while sitting at computer); isolated incident  Exacerbated by:  Sitting, rising from chair, sitting with one foot tucked under the opposite thigh when reading, lying on either side               Alleviated by: bending, taking a hot shower, moving, NSAIDs  Time of day: worse in the p.m.  Progression of symptoms since onset:  Gradually improving since taking NSAIDs  Previous Treatment:  None    General Health as reported by patient: Good  Pertinent Medical History: OA, Thyroid problems, Sjogren's Syndrome  Surgical History: neuroma excision from foot, cholecystectomy, hysterectomy  Imaging:  X-rays  Medications:  NSAID, Thyroid    Occupation:  Retired;  Primary Job Tasks: NA  Restrictions:  None  Barriers to Treatment:  None  Red Flags:  patient denies any fever, chills, or " "night sweats; recent infection; unexplained weight loss; gait abnormalities; changes in bowel/bladder    Current Functional Status: impaired  Previous Functional Status:  fully functional  Leisure Activities: walks 4-5 miles daily, enjoys playing golf    Patient's Goal(s):  To be able to continue walking and golfing without pain            Objective:  System  Posture:  Sitting posture poor  Correction of sitting posture: \"makes my back sore\"  Decreased Lumbar lordosis in standing       Lumbar/SI Evaluation  ROM:    AROM Lumbar:   Flexion:          WNL  Ext:                    Major loss   Side Bend:        Left:     Right:   Rotation:           Left:     Right:   Side Glide:        Left:  Mild loss     Right:  Min loss          Lumbar Myotomes:  normal                                                              Hip Evaluation  HIP AROM:  AROM:    Left Hip:     Normal    Right Hip:   Normal                  Hip PROM:  Hip PROM:  Left Hip:    Normal  Right Hip:  Normal                          Hip Strength:    Flexion:   Left: 4/5   -  Pain:  Right: 4/5   -  Pain:                    Extension:  Left: 4/5  -  Pain:Right: 4/5    -  Pain:    Abduction:  Left: 3+/5    -   Pain:Right: 3+/5   -   Pain:                  Hip Special Testing:      Left hip negative for the following special tests:  Maggie; Fadir/Labrum or SLR  Right hip negative for the following special tests:  Maggie; Fadir/Labrum or SLR    Hip Palpation:      Left hip tenderness not present at:  Greater Trachanter; Piriformis; PSIS; Gluteus Medius or Bursa  Right hip tenderness present at:  PSIS  Right hip tenderness not present at:  Greater Trachanter; Piriformis; Gluteus Medius or Bursa         Mobility: hypomobility to PA's throughout lumbar spine    Other:  Repeated lumbar extension in standing: no effect during, but felt better after with increased ROM and less pain sitting with towel roll  General     ROS    Assessment/Plan:    Patient is a 65 year old " female with R>L hip and low back complaints.    Patient has the following significant findings with corresponding treatment plan.                Diagnosis 1:  Bilateral Hip OA  Pain -  self management, education, directional preference exercise and home program  Decreased ROM/flexibility - therapeutic exercise  Decreased strength - therapeutic exercise  Decreased function - home program  Impaired posture - neuro re-education    Therapy Evaluation Codes:   1) History comprised of:   Personal factors that impact the plan of care:      Past/current experiences and Time since onset of symptoms.    Comorbidity factors that impact the plan of care are:      Osteoarthritis and Thyroid problems.     Medications impacting care: Anti-inflammatory and Thyroid medication.  2) Examination of Body Systems comprised of:   Body structures and functions that impact the plan of care:      Hip and Lumbar spine.   Activity limitations that impact the plan of care are:      Sitting, Sleeping and rising from chair.  3) Clinical presentation characteristics are:   Evolving/Changing.  4) Decision-Making    Moderate complexity using standardized patient assessment instrument and/or measureable assessment of functional outcome.  Cumulative Therapy Evaluation is: Moderate complexity.    Previous and current functional limitations:  (See Goal Flow Sheet for this information)    Short term and Long term goals: (See Goal Flow Sheet for this information)     Communication ability:  Patient appears to be able to clearly communicate and understand verbal and written communication and follow directions correctly.  Treatment Explanation - The following has been discussed with the patient:   RX ordered/plan of care  Anticipated outcomes  Possible risks and side effects  This patient would benefit from PT intervention to resume normal activities.   Rehab potential is good.    Frequency:  1 X week, once daily  Duration:  for 4 weeks  Discharge Plan:   Achieve all LTG.  Independent in home treatment program.  Reach maximal therapeutic benefit.    Please refer to the daily flowsheet for treatment today, total treatment time and time spent performing 1:1 timed codes.

## 2021-05-03 NOTE — LETTER
DEPARTMENT OF HEALTH AND HUMAN SERVICES  CENTERS FOR MEDICARE & MEDICAID SERVICES    PLAN/UPDATED PLAN OF PROGRESS FOR OUTPATIENT REHABILITATION    PATIENTS NAME:  Nicolasa Vogel   : 1955  PROVIDER NUMBER:  [id number]  [id number]   PROVIDER NAME: [facility]  MEDICAL RECORD NUMBER: [id number]   START OF CARE DATE:    SOC Date: 21   TYPE:  OT  PRIMARY/TREATMENT DIAGNOSIS: (Pertinent Medical Diagnosis)   Primary osteoarthritis of both hands  Primary osteoarthritis of both hips  Bilateral hand pain  VISITS FROM START OF CARE:  Rxs Used: 1     Hand Therapy Initial Evaluation  Current Date:  5/3/2021    Subjective:  Nicolasa Vogel is a 65 year old right hand dominant female.  Diagnosis:   Bilateral hand, wrist, thumb pain due to OA, right > left  DOI:  2021 (therapy referral)  Patient reports symptoms of pain, stiffness/loss of motion, weakness/loss of strength and edema of bilateral wrist, hands and thumbs which occurred due to gradual onset over the past 10 years. Since onset symptoms are gradually getting worse in the past 3 years.  Special tests:  x-ray.  Previous treatment: none.  General health as reported by patient is good.  Pertinent medical history includes:Osteoarthritis, Thyroid Problems  Medical allergies:see list in EMR.  Surgical history: other: foot nueroma, gall bladder, hysterectomy.  Medication history: Anti-inflammatory, Thyroid.    Occupational Profile Information:  Current occupation is Retired  Home Tasks: Computer work, gardening, cleaning  Prior functional level:  independent-shared household chores  Barriers include:none  Mobility: No difficulty  Transportation: drives  Leisure activities/hobbies: golfing, gardening, gave up knitting  Functional Outcome Measure:  Upper Extremity Functional Index  SCORE:   Column Totals: 68/80  (A lower score indicates greater disability.)    Objective:  Pain Level (Scale 0-10):   5/3/2021   Least  3    Worst 6-7       PATIENTS NAME:  Nicolasa Vogel   : 1955    Pain Description:  Date 5/3/2021   Location wrists, hands and thumbs   Pain Quality Aching and Tender   Frequency constant     Pain is worst  daytime and nighttime   Exacerbated by  overuse   Relieved by heat and rest   Progression Gradually getting worse     Edema  Mild     Sensation  WNL throughout all nerve distributions; per patient report    ROM  Pain Report:  - none    + mild    ++ moderate    +++ severe   AROM( PROM) 5/3/2021   Finger flexion from Distal Palmar Crease R:2-3 cm  L:1-2 cm   Wrist Extension R:40  L:60   Wrist Flexion R:75  L:75   Thumb RAbd R:30  L:35   Thumb PAbd R:25  L:35     Observation  - none    + mild    ++ moderate    +++ severe    5/3/2021   Dorsal wrist extensor synovitis R:+  L:+   MP joints swelling R:-  L:-   Reji's nodes at PIP joints R:+ long finger  L:-   Heberden's nodes at DIP joints R:++  L:++   Shoulder deformity present over thumb CMC joint R:+  L:+   Edema over thumb CMC joint R:-  L:-             PATIENTS NAME:  Nicolasa Vogel   : 1955    Strength:   (Measured in pounds)  Pain Report:  - none    + mild    ++ moderate    +++ severe    5/3/2021 5/3/2021   Trials Right Left   1 36+ 43-     Lat Pinch 5/3/2021 5/3/2021   Trials Right Left   1 8+ 14-     3 Pt Pinch 5/3/2021 5/3/2021   Trials Right Left   1 7+ 11-     Provocative Tests  Pain Report:  - none    + mild    ++ moderate    +++ severe    Thumb 5/3/2021   CMC Grind test R: -  L: -   Crepitus present R: -  L: -   CMC Adduction Stress Test R: -  L: -   CMC Extension Stress Test R: -  L: -       Palpation   Pain Report:  - none    + mild    ++ moderate    +++ severe    5/3/2021   Thumb CMC Joint Line R: -  L: -   Thenar Eminence R: -  L: -   Web Space R: -  L: -   1st DC R: -  L: -   Radial Styloid R: -  L: -   FCR R: -  L: -   PIP Joints R: + long  L: -   DIP Joints R: ++  L: ++   Thumb IP Joint R:++  L:++      PATIENTS NAME:  Nicolasa Vogel    : 1955    Assessment:  Patient presents with symptoms consistent with diagnosis of bilateral wrist, hand and thumb pain due to arthritis, with conservative intervention.     Patient's limitations or Problem List includes:  Pain, Decreased ROM/motion, Increased edema, Weakness, Decreased  and Decreased pinch of bilateral wrist,s hands and thumbs which interferes with the patient's ability to perform Self Care Tasks (dressing, eating), Sleep Patterns, Recreational Activities and Household Chores as compared to previous level of function.    Rehab Potential:  Good - Return to full activity, some limitations    Patient will benefit from skilled Occupational Therapy to increase flexibility, overall strength,  strength and pinch strength and decrease pain and edema to return to previous activity level and resume normal daily tasks and to reach their rehab potential.    Barriers to Learning:  No barrier    Communication Issues:  Patient appears to be able to clearly communicate and understand verbal and written communication and follow directions correctly.    Chart Review: Chart Review and Simple history review with patient    Identified Performance Deficits: dressing, home establishment and management, meal preparation and cleanup, work and leisure activities    Assessment of Occupational Performance:  5 or more Performance Deficits    Clinical Decision Making (Complexity): Low complexity    Treatment Explanation:  The following has been discussed with the patient:  RX ordered/plan of care  Anticipated outcomes  Possible risks and side effects    Plan:  Frequency:  2 X a month, once daily  Duration:  for 2 months    Treatment Plan:    Modalities:    Paraffin   Therapeutic Exercise:    AROM, AAROM, PROM, Tendon Gliding, Blocking, Isotonics, Isometrics and Stabilization  Therapeutic Activities:   Functional activities   Orthotic Fabrication:    Finger, Hand and Forearm based static orthosis  Self Care:   "  Self Care Tasks, Ergonomic Considerations and Joint Protection and Adaptive Equipment education   PATIENTS NAME:  Nicolasa Vogel   : 1955    Discharge Plan:  Achieve all LTG.  Independent in home treatment program.  Reach maximal therapeutic benefit.    Home Exercise Program:  Joint Protection Education:  Respect pain  Balance rest and activity  Reduce force/effort  Avoid positions of deformity  Use larger joints  Avoid firm grasp or pinch    Exercise:  Warmth for morning stiffness, consider home paraffin  Gentle pain free AROM of fingers with 3 fist tendon gliding  Gentle AROM thumb E/F and opposition  Gentle AROM wrist E/F and R/U    Orthosis  Night time wear of right index gutter orthosis and isotoner glove    Next Visit:  See in 2 weeks  Trial paraffin  Assess response to HEP and right isotoner glove and index finger gutter orthosis  Consider other finger orthoses or DELTA resting orthoses  Add gentle isometric strengthening and stabilization exercises   Consider \"incrediwear\" gloves for golf?      Caregiver Signature/Credentials ______________________________ Date ________       Treating Provider: Caitlyn Mireles, AARONR/L, CHT    I have reviewed and certified the need for these services and plan of treatment while under my care.        PHYSICIAN'S SIGNATURE:   _________________________________________  Date___________    Garcia Chatman MD    Certification period: Beginning of Cert date period: 21 End of Cert period date: 21     Functional Level Progress Report: Please see attached \"Goal Flow sheet for Functional level.\"    ___X_____ Continue Services or       ________ DC Services                Service dates: SOC Date: 21  to present                                                                     "

## 2021-05-03 NOTE — PROGRESS NOTES
Hand Therapy Initial Evaluation    Current Date:  5/3/2021    Subjective:  Nicolasa Vogel is a 65 year old right hand dominant female.    Diagnosis:   Bilateral hand, wrist, thumb pain due to OA, right > left  DOI:  4/20/2021 (therapy referral)    Patient reports symptoms of pain, stiffness/loss of motion, weakness/loss of strength and edema of bilateral wrist, hands and thumbs which occurred due to gradual onset over the past 10 years. Since onset symptoms are gradually getting worse in the past 3 years.  Special tests:  x-ray.  Previous treatment: none.  General health as reported by patient is good.  Pertinent medical history includes:Osteoarthritis, Thyroid Problems  Medical allergies:see list in EMR.  Surgical history: other: foot nueroma, gall bladder, hysterectomy.  Medication history: Anti-inflammatory, Thyroid.    Occupational Profile Information:  Current occupation is Retired  Home Tasks: Computer work, gardening, cleaning  Prior functional level:  independent-shared household chores  Barriers include:none  Mobility: No difficulty  Transportation: drives  Leisure activities/hobbies: golfing, gardening, gave up knitting    Functional Outcome Measure:  Upper Extremity Functional Index  SCORE:   Column Totals: 68/80  (A lower score indicates greater disability.)    Objective:  Pain Level (Scale 0-10):   5/3/2021   Least  3   Worst 6-7     Pain Description:  Date 5/3/2021   Location wrists, hands and thumbs   Pain Quality Aching and Tender   Frequency constant     Pain is worst  daytime and nighttime   Exacerbated by  overuse   Relieved by heat and rest   Progression Gradually getting worse     Edema  Mild     Sensation  WNL throughout all nerve distributions; per patient report    ROM  Pain Report:  - none    + mild    ++ moderate    +++ severe   AROM( PROM) 5/3/2021   Finger flexion from Distal Palmar Crease R:2-3 cm  L:1-2 cm   Wrist Extension R:40  L:60   Wrist Flexion R:75  L:75   Thumb RAbd R:30  L:35    Thumb PAbd R:25  L:35     Observation  - none    + mild    ++ moderate    +++ severe    5/3/2021   Dorsal wrist extensor synovitis R:+  L:+   MP joints swelling R:-  L:-   Reji's nodes at PIP joints R:+ long finger  L:-   Heberden's nodes at DIP joints R:++  L:++   Shoulder deformity present over thumb CMC joint R:+  L:+   Edema over thumb CMC joint R:-  L:-     Strength:   (Measured in pounds)  Pain Report:  - none    + mild    ++ moderate    +++ severe    5/3/2021 5/3/2021   Trials Right Left   1 36+ 43-     Lat Pinch 5/3/2021 5/3/2021   Trials Right Left   1 8+ 14-     3 Pt Pinch 5/3/2021 5/3/2021   Trials Right Left   1 7+ 11-     Provocative Tests  Pain Report:  - none    + mild    ++ moderate    +++ severe    Thumb 5/3/2021   CMC Grind test R: -  L: -   Crepitus present R: -  L: -   CMC Adduction Stress Test R: -  L: -   CMC Extension Stress Test R: -  L: -       Palpation   Pain Report:  - none    + mild    ++ moderate    +++ severe    5/3/2021   Thumb CMC Joint Line R: -  L: -   Thenar Eminence R: -  L: -   Web Space R: -  L: -   1st DC R: -  L: -   Radial Styloid R: -  L: -   FCR R: -  L: -   PIP Joints R: + long  L: -   DIP Joints R: ++  L: ++   Thumb IP Joint R:++  L:++        Assessment:  Patient presents with symptoms consistent with diagnosis of bilateral wrist, hand and thumb pain due to arthritis, with conservative intervention.     Patient's limitations or Problem List includes:  Pain, Decreased ROM/motion, Increased edema, Weakness, Decreased  and Decreased pinch of bilateral wrist,s hands and thumbs which interferes with the patient's ability to perform Self Care Tasks (dressing, eating), Sleep Patterns, Recreational Activities and Household Chores as compared to previous level of function.    Rehab Potential:  Good - Return to full activity, some limitations    Patient will benefit from skilled Occupational Therapy to increase flexibility, overall strength,  strength and pinch  strength and decrease pain and edema to return to previous activity level and resume normal daily tasks and to reach their rehab potential.    Barriers to Learning:  No barrier    Communication Issues:  Patient appears to be able to clearly communicate and understand verbal and written communication and follow directions correctly.    Chart Review: Chart Review and Simple history review with patient    Identified Performance Deficits: dressing, home establishment and management, meal preparation and cleanup, work and leisure activities    Assessment of Occupational Performance:  5 or more Performance Deficits    Clinical Decision Making (Complexity): Low complexity    Treatment Explanation:  The following has been discussed with the patient:  RX ordered/plan of care  Anticipated outcomes  Possible risks and side effects    Plan:  Frequency:  2 X a month, once daily  Duration:  for 2 months    Treatment Plan:    Modalities:    Paraffin   Therapeutic Exercise:    AROM, AAROM, PROM, Tendon Gliding, Blocking, Isotonics, Isometrics and Stabilization  Therapeutic Activities:   Functional activities   Orthotic Fabrication:    Finger, Hand and Forearm based static orthosis  Self Care:    Self Care Tasks, Ergonomic Considerations and Joint Protection and Adaptive Equipment education     Discharge Plan:  Achieve all LTG.  Independent in home treatment program.  Reach maximal therapeutic benefit.    Home Exercise Program:  Joint Protection Education:  Respect pain  Balance rest and activity  Reduce force/effort  Avoid positions of deformity  Use larger joints  Avoid firm grasp or pinch    Exercise:  Warmth for morning stiffness, consider home paraffin  Gentle pain free AROM of fingers with 3 fist tendon gliding  Gentle AROM thumb E/F and opposition  Gentle AROM wrist E/F and R/U    Orthosis  Night time wear of right index gutter orthosis and isotoner glove    Next Visit:  See in 2 weeks  Trial paraffin  Assess response to HEP  "and right isotoner glove and index finger gutter orthosis  Consider other finger orthoses or DELTA resting orthoses  Add gentle isometric strengthening and stabilization exercises   Consider \"incrediwear\" gloves for golf?  "

## 2021-05-03 NOTE — LETTER
"DEPARTMENT OF HEALTH AND HUMAN SERVICES  CENTERS FOR MEDICARE & MEDICAID SERVICES    PLAN/UPDATED PLAN OF PROGRESS FOR OUTPATIENT REHABILITATION          PATIENTS NAME:  Nicolasa Vogel   : 1955    PROVIDER NUMBER:    [id number]  [id number]  PROVIDER [facility]: [facility]  MEDICAL RECORD NUMBER: [id number]   START OF CARE DATE:  SOC Date: 21   TYPE:  PT  PRIMARY/TREATMENT DIAGNOSIS: (Pertinent Medical Diagnosis)     Primary osteoarthritis of both hands  Primary osteoarthritis of both hips  Bilateral hip pain  VISITS FROM START OF CARE:  Rxs Used: 1              PHYSICAL THERAPY INITIAL EVALUATION    Precautions/Restrictions/MD instructions:  Evaluate & Treat - Hip Pain  Therapist Assessment: Nicolasa Vogel is a 65 year old female who presents to Physical Therapy with R>L hip and LBP.  Patient demonstrates normal hip ROM both actively and passively, hip weakness, and significant mobility loss into lumbar extension. Signs and symptoms suggest mechanical LBP as primary source of pain.   These impairments limit her ability to sit comfortably, perform sit to stand transitions, and sleep.  Skilled PT services are necessary in order to reduce impairments and improve independent function.     SUBJECTIVE:  Chief Complaint: R>L hip/LB pain  Onset Date:  Approximately 8 yrs ago      MD referral date: 21    DOS: NA  Mechanism of Injury:  Insidious onset; history of 4 falls in the past 1.5 years  (cross country skiing, slipping on the ice, going down basement steps)  New/Recurrent/Chronic:  chronic  Pain Location: right > left buttock and right LB described as intermittent dull ache and rated as 3/10  Associated Symptoms: occasional numbness (right leg \"went completely dead\" recently while sitting at computer); isolated incident  Exacerbated by:  Sitting, rising from chair, sitting with one foot tucked under the opposite thigh when reading, lying on either side             " "  Alleviated by: bending, taking a hot shower, moving, NSAIDs  Time of day: worse in the p.m.  Progression of symptoms since onset:  Gradually improving since taking NSAIDs  Previous Treatment:  None  General Health as reported by patient: Good  Pertinent Medical History: OA, Thyroid problems, Sjogren's Syndrome  Surgical History: neuroma excision from foot, cholecystectomy, hysterectomy  Imaging:  X-rays  Medications:  NSAID, Thyroid  Occupation:  Retired;  Primary Job Tasks: NA  Restrictions:  None  Barriers to Treatment:  None  Red Flags:  patient denies any fever, chills, or night sweats; recent infection; unexplained weight loss; gait abnormalities; changes in bowel/bladder  Current Functional Status: impaired  Previous Functional Status:  fully functional  Leisure Activities: walks 4-5 miles daily, enjoys playing golf  Patient's Goal(s):  To be able to continue walking and golfing without pain    Objective:  System  Posture:  Sitting posture poor  Correction of sitting posture: \"makes my back sore\"  Decreased Lumbar lordosis in standing    Lumbar/SI Evaluation  ROM:    AROM Lumbar:   Flexion:          WNL  Ext:                    Major loss   Side Bend:        Left:     Right:   Rotation:           Left:     Right:   Side Glide:        Left:  Mild loss     Right:  Min loss  Lumbar Myotomes:  normal   Hip Evaluation  HIP AROM:  AROM:    Left Hip:     Normal    Right Hip:   Normal  Hip PROM:  Hip PROM:  Left Hip:    Normal  Right Hip:  Normal  Hip Strength:    Flexion:   Left: 4/5   -  Pain:  Right: 4/5   -  Pain:               Extension:  Left: 4/5  -  Pain:Right: 4/5    -  Pain:    Abduction:  Left: 3+/5    -   Pain:Right: 3+/5   -   Pain:  Hip Special Testing:    Left hip negative for the following special tests:  Maggie; Fadir/Labrum or SLR  Right hip negative for the following special tests:  Maggie; Fadir/Labrum or SLR    Hip Palpation:    Left hip tenderness not present at:  Greater Trachanter; Piriformis; " PSIS; Gluteus Medius or Bursa  Right hip tenderness present at:  PSIS  Right hip tenderness not present at:  Greater Trachanter; Piriformis; Gluteus Medius or Bursa   Mobility: hypomobility to PA's throughout lumbar spine  Other:  Repeated lumbar extension in standing: no effect during, but felt better after with increased ROM and less pain sitting with towel roll    Assessment/Plan:    Patient is a 65 year old female with R>L hip and low back complaints.    Patient has the following significant findings with corresponding treatment plan.                Diagnosis 1:  Bilateral Hip OA  Pain -  self management, education, directional preference exercise and home program  Decreased ROM/flexibility - therapeutic exercise  Decreased strength - therapeutic exercise  Decreased function - home program  Impaired posture - neuro re-education  Therapy Evaluation Codes:   1) History comprised of:   Personal factors that impact the plan of care:      Past/current experiences and Time since onset of symptoms.    Comorbidity factors that impact the plan of care are:      Osteoarthritis and Thyroid problems.     Medications impacting care: Anti-inflammatory and Thyroid medication.  2) Examination of Body Systems comprised of:   Body structures and functions that impact the plan of care:      Hip and Lumbar spine.   Activity limitations that impact the plan of care are:      Sitting, Sleeping and rising from chair.  3) Clinical presentation characteristics are:   Evolving/Changing.  4) Decision-Making    Moderate complexity using standardized patient assessment instrument and/or measureable assessment of functional outcome.  Cumulative Therapy Evaluation is: Moderate complexity.  Previous and current functional limitations:  (See Goal Flow Sheet for this information)    Short term and Long term goals: (See Goal Flow Sheet for this information)   Communication ability:  Patient appears to be able to clearly communicate and understand  "verbal and written communication and follow directions correctly.  Treatment Explanation - The following has been discussed with the patient:   RX ordered/plan of care  Anticipated outcomes  Possible risks and side effects  This patient would benefit from PT intervention to resume normal activities.   Rehab potential is good.  Frequency:  1 X week, once daily  Duration:  for 4 weeks  Discharge Plan:  Achieve all LTG.  Independent in home treatment program.  Reach maximal therapeutic benefit.        Caregiver Signature/Credentials _____________________________ Date __________        Desiree Rocha, PT, OCS   I have reviewed and certified the need for these services and plan of treatment while under my care.      PHYSICIAN'S SIGNATURE:   _____________________________________  Date___________     Garcia Chatman MD    Certification period:  Beginning of Cert date period: 05/03/21 to  End of Cert period date: 07/01/21   Functional Level Progress Report: Please see attached \"Goal Flow sheet for Functional level.\"  ____X____ Continue Services or       ________ DC Services              Service dates: From  SOC Date: 05/03/21 date to present                         "

## 2021-07-06 PROBLEM — M19.041 PRIMARY OSTEOARTHRITIS OF BOTH HANDS: Status: RESOLVED | Noted: 2021-05-03 | Resolved: 2021-07-06

## 2021-07-06 PROBLEM — M19.042 PRIMARY OSTEOARTHRITIS OF BOTH HANDS: Status: RESOLVED | Noted: 2021-05-03 | Resolved: 2021-07-06

## 2021-07-06 PROBLEM — M79.641 BILATERAL HAND PAIN: Status: RESOLVED | Noted: 2021-05-03 | Resolved: 2021-07-06

## 2021-07-06 PROBLEM — M79.642 BILATERAL HAND PAIN: Status: RESOLVED | Noted: 2021-05-03 | Resolved: 2021-07-06

## 2021-07-15 DIAGNOSIS — M35.00 SICCA SYNDROME (H): ICD-10-CM

## 2021-07-15 DIAGNOSIS — R76.8 CYCLIC CITRULLINATED PEPTIDE (CCP) ANTIBODY POSITIVE: ICD-10-CM

## 2021-07-16 PROBLEM — M25.551 BILATERAL HIP PAIN: Status: RESOLVED | Noted: 2021-05-03 | Resolved: 2021-07-16

## 2021-07-16 PROBLEM — M25.552 BILATERAL HIP PAIN: Status: RESOLVED | Noted: 2021-05-03 | Resolved: 2021-07-16

## 2021-07-19 RX ORDER — CEVIMELINE HYDROCHLORIDE 30 MG/1
30 CAPSULE ORAL 3 TIMES DAILY
Qty: 270 CAPSULE | Refills: 0 | Status: SHIPPED | OUTPATIENT
Start: 2021-07-19 | End: 2021-12-29

## 2021-07-19 NOTE — TELEPHONE ENCOUNTER
cevimeline (EVOXAC) 30 MG capsule   Take 1 capsule (30 mg) by mouth 3 times daily      Last Written Prescription Date:  4/20/21  Last Fill Quantity: 90,   # refills: 2  Last Office Visit : 4/20/21  Continue cevimeline 30 mg 3-4 times a day for sicca symptoms  Follow-up in 6 months with labs.  Future Office visit: none    Refill to pharmacy.     Scheduling has been notified to contact the pt for appointment.

## 2021-09-12 ENCOUNTER — HEALTH MAINTENANCE LETTER (OUTPATIENT)
Age: 66
End: 2021-09-12

## 2021-10-28 ENCOUNTER — OFFICE VISIT (OUTPATIENT)
Dept: RHEUMATOLOGY | Facility: CLINIC | Age: 66
End: 2021-10-28
Payer: MEDICARE

## 2021-10-28 VITALS
TEMPERATURE: 97.9 F | DIASTOLIC BLOOD PRESSURE: 62 MMHG | OXYGEN SATURATION: 100 % | WEIGHT: 157.3 LBS | HEART RATE: 68 BPM | SYSTOLIC BLOOD PRESSURE: 116 MMHG

## 2021-10-28 DIAGNOSIS — M35.00 SICCA SYNDROME (H): ICD-10-CM

## 2021-10-28 DIAGNOSIS — M19.041 PRIMARY OSTEOARTHRITIS OF BOTH HANDS: ICD-10-CM

## 2021-10-28 DIAGNOSIS — M16.0 PRIMARY OSTEOARTHRITIS OF BOTH HIPS: ICD-10-CM

## 2021-10-28 DIAGNOSIS — M35.00 SJOGREN'S SYNDROME, WITH UNSPECIFIED ORGAN INVOLVEMENT (H): Primary | ICD-10-CM

## 2021-10-28 DIAGNOSIS — M19.042 PRIMARY OSTEOARTHRITIS OF BOTH HANDS: ICD-10-CM

## 2021-10-28 DIAGNOSIS — R76.8 CYCLIC CITRULLINATED PEPTIDE (CCP) ANTIBODY POSITIVE: ICD-10-CM

## 2021-10-28 LAB
ALBUMIN SERPL-MCNC: 3.8 G/DL (ref 3.4–5)
ALBUMIN UR-MCNC: NEGATIVE MG/DL
ALP SERPL-CCNC: 100 U/L (ref 40–150)
ALT SERPL W P-5'-P-CCNC: 33 U/L (ref 0–50)
ANION GAP SERPL CALCULATED.3IONS-SCNC: 1 MMOL/L (ref 3–14)
APPEARANCE UR: CLEAR
AST SERPL W P-5'-P-CCNC: 25 U/L (ref 0–45)
BACTERIA #/AREA URNS HPF: ABNORMAL /HPF
BILIRUB SERPL-MCNC: 0.3 MG/DL (ref 0.2–1.3)
BILIRUB UR QL STRIP: NEGATIVE
BUN SERPL-MCNC: 21 MG/DL (ref 7–30)
CALCIUM SERPL-MCNC: 9.2 MG/DL (ref 8.5–10.1)
CHLORIDE BLD-SCNC: 105 MMOL/L (ref 94–109)
CO2 SERPL-SCNC: 32 MMOL/L (ref 20–32)
COLOR UR AUTO: ABNORMAL
CREAT SERPL-MCNC: 0.79 MG/DL (ref 0.52–1.04)
CREAT UR-MCNC: 51 MG/DL
CRP SERPL-MCNC: 7.8 MG/L (ref 0–8)
ERYTHROCYTE [DISTWIDTH] IN BLOOD BY AUTOMATED COUNT: 13.2 % (ref 10–15)
ERYTHROCYTE [SEDIMENTATION RATE] IN BLOOD BY WESTERGREN METHOD: 20 MM/HR (ref 0–30)
GFR SERPL CREATININE-BSD FRML MDRD: 78 ML/MIN/1.73M2
GLUCOSE BLD-MCNC: 96 MG/DL (ref 70–99)
GLUCOSE UR STRIP-MCNC: NEGATIVE MG/DL
HCT VFR BLD AUTO: 41.6 % (ref 35–47)
HGB BLD-MCNC: 13.5 G/DL (ref 11.7–15.7)
HGB UR QL STRIP: NEGATIVE
KETONES UR STRIP-MCNC: NEGATIVE MG/DL
LEUKOCYTE ESTERASE UR QL STRIP: NEGATIVE
MCH RBC QN AUTO: 29.8 PG (ref 26.5–33)
MCHC RBC AUTO-ENTMCNC: 32.5 G/DL (ref 31.5–36.5)
MCV RBC AUTO: 92 FL (ref 78–100)
NITRATE UR QL: POSITIVE
PH UR STRIP: 6 [PH] (ref 5–7)
PLATELET # BLD AUTO: 278 10E3/UL (ref 150–450)
POTASSIUM BLD-SCNC: 4.4 MMOL/L (ref 3.4–5.3)
PROT SERPL-MCNC: 7.6 G/DL (ref 6.8–8.8)
PROT UR-MCNC: 0.09 G/L
PROT/CREAT 24H UR: 0.18 G/G CR (ref 0–0.2)
RBC # BLD AUTO: 4.53 10E6/UL (ref 3.8–5.2)
RBC #/AREA URNS AUTO: ABNORMAL /HPF
SODIUM SERPL-SCNC: 138 MMOL/L (ref 133–144)
SP GR UR STRIP: 1.02 (ref 1–1.03)
SQUAMOUS #/AREA URNS AUTO: ABNORMAL /LPF
TSH SERPL DL<=0.005 MIU/L-ACNC: 1.21 MU/L (ref 0.4–4)
UROBILINOGEN UR STRIP-MCNC: NORMAL MG/DL
WBC # BLD AUTO: 4.8 10E3/UL (ref 4–11)
WBC #/AREA URNS AUTO: ABNORMAL /HPF

## 2021-10-28 PROCEDURE — 85027 COMPLETE CBC AUTOMATED: CPT | Performed by: STUDENT IN AN ORGANIZED HEALTH CARE EDUCATION/TRAINING PROGRAM

## 2021-10-28 PROCEDURE — 36415 COLL VENOUS BLD VENIPUNCTURE: CPT | Performed by: STUDENT IN AN ORGANIZED HEALTH CARE EDUCATION/TRAINING PROGRAM

## 2021-10-28 PROCEDURE — 87186 SC STD MICRODIL/AGAR DIL: CPT | Performed by: STUDENT IN AN ORGANIZED HEALTH CARE EDUCATION/TRAINING PROGRAM

## 2021-10-28 PROCEDURE — 81001 URINALYSIS AUTO W/SCOPE: CPT | Performed by: STUDENT IN AN ORGANIZED HEALTH CARE EDUCATION/TRAINING PROGRAM

## 2021-10-28 PROCEDURE — 84443 ASSAY THYROID STIM HORMONE: CPT | Performed by: STUDENT IN AN ORGANIZED HEALTH CARE EDUCATION/TRAINING PROGRAM

## 2021-10-28 PROCEDURE — 80053 COMPREHEN METABOLIC PANEL: CPT | Performed by: STUDENT IN AN ORGANIZED HEALTH CARE EDUCATION/TRAINING PROGRAM

## 2021-10-28 PROCEDURE — 85652 RBC SED RATE AUTOMATED: CPT | Performed by: STUDENT IN AN ORGANIZED HEALTH CARE EDUCATION/TRAINING PROGRAM

## 2021-10-28 PROCEDURE — 86140 C-REACTIVE PROTEIN: CPT | Performed by: STUDENT IN AN ORGANIZED HEALTH CARE EDUCATION/TRAINING PROGRAM

## 2021-10-28 PROCEDURE — 84156 ASSAY OF PROTEIN URINE: CPT | Performed by: STUDENT IN AN ORGANIZED HEALTH CARE EDUCATION/TRAINING PROGRAM

## 2021-10-28 PROCEDURE — 87086 URINE CULTURE/COLONY COUNT: CPT | Performed by: STUDENT IN AN ORGANIZED HEALTH CARE EDUCATION/TRAINING PROGRAM

## 2021-10-28 PROCEDURE — 99214 OFFICE O/P EST MOD 30 MIN: CPT | Performed by: STUDENT IN AN ORGANIZED HEALTH CARE EDUCATION/TRAINING PROGRAM

## 2021-10-28 RX ORDER — CYCLOSPORINE 0.5 MG/ML
EMULSION OPHTHALMIC
COMMUNITY
Start: 2021-03-03 | End: 2023-01-04

## 2021-10-28 RX ORDER — CHOLECALCIFEROL (VITAMIN D3) 50 MCG
1 TABLET ORAL DAILY
COMMUNITY
End: 2024-02-15

## 2021-10-28 NOTE — PROGRESS NOTES
Rheumatology clinic visit note          Active Problem List:     Patient Active Problem List    Diagnosis Date Noted     Primary osteoarthritis involving multiple joints      Priority: Medium            History of Present Illness:   Nicolasa Vogel is a 66 year old female with PMH of sicca symptoms, Osteoarthritis, meningioma, hypothyroidism seen in consultation at request of Dr. Josselin Cruz for evaluation of joint pains and sicca symptoms.     She has pain in her joints for more than 10 years.  Sometimes they come as waves when she has more pain in the joints for couple weeks and then it improves.  Mostly involves hips, low back, thighs.  She was evaluated by rheumatologist Dr. Fung in 2014 and then by Dr. Justice in 2020.  Her autoimmune work-up in the past has shown negative SARAY, JORGE LUIS panel, rheumatoid serologies.  Last year her anti-CCP antibody was elevated.  But as per previous rheumatologist's note no evidence of synovitis was noted over her joints.  Mostly degenerative changes were noted.  She has done physical therapy for hip and low back pain.  She tries to be active and walk 4 to 5 miles a day.    Right index finger DIP joint, thumb IP joint hurt. She does not knit any more. Heberden nodes present over the DIP joint. Right wrist ROM is limited. No pain in her elbows, shoulders, knees, ankles. Pain in her big toes, lower back.     Her other main concern is sicca symptoms.  She has dry eyes dry mouth since 2013 and has developed tooth decay secondary to dry mouth.  She follows up with a dentist and ophthalmologist to manage sicca symptoms. Dry eyes have become worse since last fall. When she wake up she put drops in her eyes.  Her PCP wonder about Sjogren's and RA.     She has noticed that her scalp hurts when washing her hair. She has more bloating, constipation. She has chronic allergies. Her sister has Raynaud's, niece has Lupus. Dad had wegener's    No history of psoriasis, ulcerative colitis or  chron's disease. No h/o iritis, enthesitis, finger or toe swelling like dactylitis, plantar fascitis or heel pain. Denies any raynauds, malar rash, photosensitivity, recurrent mouth/genital ulcers, pleuritic chest pains, recurrent sinusitis/rhinitis, swallowing difficulty, hearing or visual changes recently.  No h/o arterial/venous thrombosis in the past. Denies any tick bite, recent GI/ infection.     April 20, 2021 - She has tried Cevimeline and that has helped her significantly with sicca symptoms. She use gel at night for her eyes which has helped with her eyes. No new abrasion or infection in her eyes. Hands, back and hips have pain. She walk everyday. She has pain in her back, MRI of L spine showed DDD. She has seen Orthopedics at [facility] and was recommended to do PT. She gets pain in her DIP joints, right wrist pain. Both hips hurt, right hip more during the day. Denies any pain in her knees, feet. Minor salivary gland lip biopsy done on 3/26/21 showed 2 focus of lymphoplasmacytic infiltrate of 50 lymphocyte along with chronic fibrosis. X ray of Hands, wrists mostly showed degenerative arthritis. Autoimmune work up done on 3/24/2021 showed negative SARAY, JORGE LUIS panel, rheumatoid factor, anti-CCP, normal CBC, CMP, ESR, CRP.    October 28, 2021 - Eyes are better with use of lubricating eye drops and eye ointment. Cevimeline has helped with dry mouth. She do Lower back and hand exercises which helps. She take 2 tylenol arthritis and Ibuprofen as needed. In the last few months she has noticed easy bruising. Also get flushing, increased sweating. It was present even before starting Cevimeline and seems to have worsened. Reports stiffness in her neck. She has lumbar DDD. Fatigue has not improved.          Review of Systems:     Review Of Systems  Constitutional: denies fever, chills, night sweats and weight loss.  Skin: No skin rash.  Eyes: + dryness or irritation in eyes. No episode of eye inflammation or  redness.   Ears/Nose/Throat: no recurrent sinus infections.  Respiratory: No shortness of breath, dyspnea on exertion, cough, or hemoptysis  Cardiovascular: no chest pain or palpitations.  Gastrointestinal: no nausea, vomiting, abdominal pain.  Normal bowel movements.  Genitourinary: no dysuria, frequency  or hematuria.  Musculoskeletal: as in HPI  Neurologic: no numbness, tingling.  Psychiatric: no mood disorders.  Hematologic/Lymphatic/Immunologic: no history of easy bruising, petechia or purpura.  No abnormal bleeding.   Endocrine: +  h/o thyroid disease or Diabetes.                  Past Medical History:     Past Medical History:   Diagnosis Date     Primary osteoarthritis involving multiple joints      Sicca syndrome (H)      Past Surgical History:   Procedure Laterality Date     CHOLECYSTECTOMY       HYSTERECTOMY TOTAL ABDOMINAL              Social History:     Social History     Occupational History     Not on file   Tobacco Use     Smoking status: Never Smoker     Smokeless tobacco: Never Used   Substance and Sexual Activity     Alcohol use: Yes     Comment: socially     Drug use: Never     Sexual activity: Not Currently     Partners: Male            Family History:     Family History   Problem Relation Age of Onset     Vasculitis Father      Raynaud syndrome Sister      Lupus Niece             Allergies:     Allergies   Allergen Reactions     Diclofenac Shortness Of Breath     After using gel on joints of hands she became SOB with lightheadedness     Penicillins Hives     Tetracycline Hives            Medications:     Current Outpatient Medications   Medication Sig Dispense Refill     Calcium-Magnesium-Vitamin D (CALCIUM 1200+D3 PO)        cevimeline (EVOXAC) 30 MG capsule Take 1 capsule (30 mg) by mouth 3 times daily 270 capsule 0     cycloSPORINE (RESTASIS) 0.05 % ophthalmic emulsion INSTILL 1 DROP INTO BOTH EYES TWICE A DAY       levothyroxine (SYNTHROID) 112 MCG tablet Take 112 mcg by mouth        MAGNESIUM CITRATE PO        Multiple Vitamin (MULTIVITAMIN ADULT PO)        Multiple Vitamins-Minerals (ICAPS AREDS FORMULA PO) Take 2 tablets by mouth daily       Omega-3 Fatty Acids (FISH OIL) 875 MG CAPS        polyethyl glycol-propyl glycol (SYSTANE) 0.4-0.3 % GEL        vitamin D3 (CHOLECALCIFEROL) 50 mcg (2000 units) tablet Take 1 tablet by mouth daily       Turmeric (QC TUMERIC COMPLEX PO)  (Patient not taking: Reported on 10/28/2021)              Physical Exam:   Vitals not taken.   Wt Readings from Last 4 Encounters:   10/28/21 71.4 kg (157 lb 4.8 oz)       Constitutional: Moderately built, appearing stated age; cooperative  MS: Right index finger DIP joint, thumb IP joint hurt. Heberden nodes over the DIP joint. R index, middle PIP giovana node. Right wrist, CMC joint hurt. Right wrist ROM is limited. No pain in her elbows, shoulders, knees, ankles. Pain in her big toes, lower back.     Psych: nl judgement, orientation, memory, affect.         Data:     No results found for any visits on 10/28/21.    Recent Labs   Lab Test 12/21/20 12/20/19   TSH 0.39 0.77     Hemoglobin   Date Value Ref Range Status   03/24/2021 14.5 11.7 - 15.7 g/dL Final     Sed Rate   Date Value Ref Range Status   03/24/2021 27 0 - 30 mm/h Final     CRP Inflammation   Date Value Ref Range Status   03/24/2021 3.4 0.0 - 8.0 mg/L Final     AST   Date Value Ref Range Status   03/24/2021 32 0 - 45 U/L Final   12/20/2019 31 2 - 40 IU/L Final     Albumin   Date Value Ref Range Status   03/24/2021 4.0 3.4 - 5.0 g/dL Final     ALT   Date Value Ref Range Status   03/24/2021 47 0 - 50 U/L Final   12/20/2019 21 8 - 45 IU/L Final     Rheumatoid Factor   Date Value Ref Range Status   03/24/2021 <7 <12 IU/mL Final     Recent Labs   Lab Test 03/24/21  1016 12/21/20  0000 12/20/19  0000   WBC 8.5  --   --    HGB 14.5  --   --    HCT 44.6  --   --    MCV 91  --   --      --   --    TSH  --  0.39 0.77   AST 32  --  31   ALT 47  --  21     Out  side labs :     MRI L spine 12/2019  IMPRESSION:  1. L4-L5 right foraminal/extraforaminal disc extrusion displaces and likely  impinges the exiting right L4 spinal nerve.  2. Multilevel lumbar spondylosis.  3. Moderate right and mild to moderate left neural foraminal stenosis at L4-L5.  4. Mild to moderate spinal canal stenosis at L4-L5. Mild spinal canal stenosis  at L3-L4.      EXAM: XR PELVIS 1 VIEW AP AND HIP 2 VIEW BILATERAL    INDICATION: Chronic Midline Low Back Pain With Right-sided Sciatica Chronic  Midline Low Back Pain With Right-sided Sciatica  COMPARISON: None.    FINDINGS: Mild degenerative osteoarthritis of the right femoral acetabular joint  with formation of small marginal osteophytes. Left femoral acetabular joint is  intact. No acute fracture or dislocation.  Reviewed Rheumatology lab flowsheet    Assessment     Sicca symptoms  Minor salivary and lip biopsy-3/26/2021- 2 foci of lymphoplasmacytic infiltrate with more than 50 cells are noted along with focal fibrosis.  Polyarticular osteoarthritis  History of positive anti-CCP antibody - 61 - 12/2020  Negative rheumatoid factor, normal ESR, CRP, negative SARAY    Severe sicca symptoms:  she has sicca symptoms since 2013. She has developed tooth decay due to dry mouth.  Her Sjogren serologies have been negative.  Repeat autoimmune work-up done in 3/2021 is normal.  But minor salivary gland lip biopsy showed 2 foci of lymphoplasmacytic infiltrate with more than 50 cells suggestive of Sjogren's.  She has  seronegative Sjogren's based on the biopsy.  Severe sicca symptoms have responded to cevimeline.  She has normal CBC, liver, kidney function test and urine analysis.  No evidence of systemic manifestations of Sjogren's disease is noted.  No need of immune suppression.    Polyarticular arthritis: She reports pain in her joints mostly in lower back, hips, wrists for last 10 years.  MRI of the lumbar spine in 12/19 has shown multilevel degenerative disc  disease.  X-ray of her hands, hips in the past has shown mostly degenerative changes.  She has evidence of Heberden nodes over PIP DIP joints and reports tenderness over the first CMC joint.  Repeat rheumatoid serologies, inflammatory markers are normal.  X-rays of bilateral hand and wrist done on 3/24/2021 showed polyarticular osteoarthritis.  No evidence of inflammatory arthritis or erosions noted.      For osteoarthritis she can use Tylenol arthritis 1 to 2 tablets a day along with NSAIDs like ibuprofen 400-600 mg 2-3 times a day on as-needed basis. In addition Voltaren gel, compression gloves and Shashank emu cream.     Chronic Fatigue : Fatigue can be multifactorial . Common symptoms in patients with Sjogren's disease. Will get baseline labs to make sure that she does not have anemia, abnormal TSH or organ dysfunction causing fatigue. She is uptodate with her colonoscopy and Mammograms.       Plan     Joint pain related to polyarticular osteoarthritis.    Seronegative Sjogren's is not causing any systemic manifestations.  No need of immune suppression.    Continue cevimeline 30 mg 3-4 times a day for sicca symptoms    Follow-up in a year       Garcia Chatman MD  [facility]  Department of Rheumatology & Autoimmune Disorders  [facility]: [phone]  Pager - [phone]

## 2021-10-28 NOTE — NURSING NOTE
Nicolasa Vogel's goals for this visit include:   Chief Complaint   Patient presents with     RECHECK     follow up sjogrens  update care everywhere       PCP: No Ref-Primary, Physician    Referring Provider:  No referring provider defined for this encounter.      Initial /62 (BP Location: Left arm, Patient Position: Sitting)   Pulse 68   Temp 97.9  F (36.6  C) (Oral)   Wt 71.4 kg (157 lb 4.8 oz)   SpO2 100%   Breastfeeding No  There is no height or weight on file to calculate BMI.    Medication Reconciliation: complete    Do you need any medication refills at today's visit? none      [facility]

## 2021-10-28 NOTE — PATIENT INSTRUCTIONS
Will do blood tests today     Continue Cevimeline, you can reduce to once a day to see if it reduces sweating as a side effect.     Follow up in a year.

## 2021-10-30 LAB
BACTERIA UR CULT: ABNORMAL
BACTERIA UR CULT: ABNORMAL

## 2021-10-31 ENCOUNTER — MYC MEDICAL ADVICE (OUTPATIENT)
Dept: RHEUMATOLOGY | Facility: CLINIC | Age: 66
End: 2021-10-31

## 2021-10-31 DIAGNOSIS — N39.0 URINARY TRACT INFECTION WITHOUT HEMATURIA, SITE UNSPECIFIED: Primary | ICD-10-CM

## 2021-11-02 RX ORDER — SULFAMETHOXAZOLE/TRIMETHOPRIM 800-160 MG
1 TABLET ORAL 2 TIMES DAILY
Qty: 6 TABLET | Refills: 0 | Status: SHIPPED | OUTPATIENT
Start: 2021-11-02 | End: 2023-01-04

## 2021-11-02 NOTE — TELEPHONE ENCOUNTER
Brooks sent to patient.     GEMMA McbrideN, RN   Rheumatology RN Care Coordinator   [facility]   [phone]

## 2021-11-02 NOTE — RESULT ENCOUNTER NOTE
Urine culture has shown infection with E. coli bacteria.  I will send antibiotic prescription.  After finishing the course of antibiotic, please follow-up with your primary care provider for repeat urinary analysis and culture.     Garcia Chatman MD

## 2021-12-24 DIAGNOSIS — M35.00 SICCA SYNDROME (H): ICD-10-CM

## 2021-12-24 DIAGNOSIS — R76.8 CYCLIC CITRULLINATED PEPTIDE (CCP) ANTIBODY POSITIVE: ICD-10-CM

## 2021-12-29 RX ORDER — CEVIMELINE HYDROCHLORIDE 30 MG/1
30 CAPSULE ORAL 3 TIMES DAILY
Qty: 270 CAPSULE | Refills: 3 | Status: SHIPPED | OUTPATIENT
Start: 2021-12-29 | End: 2023-03-27

## 2021-12-29 NOTE — TELEPHONE ENCOUNTER
CEVIMELINE HCL 30 MG CAPSULE   Last Written Prescription Date:  7/19/2021  Last Fill Quantity: 270,   # refills: 0  Last Office Visit : 10/28/2021  Future Office visit: 11/2/2022  270 Caps, 3 Refills sent to pharm 12/29/2021      Ann Corona RN  Central Triage Red Flags/Med Refills

## 2022-02-27 ENCOUNTER — HEALTH MAINTENANCE LETTER (OUTPATIENT)
Age: 67
End: 2022-02-27

## 2023-01-03 NOTE — PROGRESS NOTES
Nicolasa is a 67 year old who is being evaluated via a billable video visit.      How would you like to obtain your AVS? MyChart  If the video visit is dropped, the invitation should be resent by: Text to cell phone: [phone]  Will anyone else be joining your video visit? No      Rheumatology clinic visit note          Active Problem List:     Patient Active Problem List    Diagnosis Date Noted     Primary osteoarthritis involving multiple joints      Priority: Medium            History of Present Illness:   Nicolasa Vogel is a 67 year old female with PMH of sicca symptoms, Osteoarthritis, meningioma, hypothyroidism seen in consultation at request of Dr. Josselin Cruz for evaluation of joint pains and sicca symptoms.     History from initial visit  : She has pain in her joints for more than 10 years.  Sometimes they come as waves when she has more pain in the joints for couple weeks and then it improves.  Mostly involves hips, low back, thighs.  She was evaluated by rheumatologist Dr. Fung in 2014 and then by Dr. Justice in 2020.  Her autoimmune work-up in the past has shown negative SARAY, JORGE LUIS panel, rheumatoid serologies.  Last year her anti-CCP antibody was elevated.  But as per previous rheumatologist's note no evidence of synovitis was noted over her joints.  Mostly degenerative changes were noted.  She has done physical therapy for hip and low back pain.  She tries to be active and walk 4 to 5 miles a day.    Right index finger DIP joint, thumb IP joint hurt. She does not knit any more. Heberden nodes present over the DIP joint. Right wrist ROM is limited. No pain in her elbows, shoulders, knees, ankles. Pain in her big toes, lower back.     Her other main concern is sicca symptoms.  She has dry eyes dry mouth since 2013 and has developed tooth decay secondary to dry mouth.  She follows up with a dentist and ophthalmologist to manage sicca symptoms. Dry eyes have become worse since last fall. When she wake up  she put drops in her eyes.  Her PCP wonder about Sjogren's and RA.     She has noticed that her scalp hurts when washing her hair. She has more bloating, constipation. She has chronic allergies. Her sister has Raynaud's, niece has Lupus. Dad had wegener's    No history of psoriasis, ulcerative colitis or chron's disease. No h/o iritis, enthesitis, finger or toe swelling like dactylitis, plantar fascitis or heel pain. Denies any raynauds, malar rash, photosensitivity, recurrent mouth/genital ulcers, pleuritic chest pains, recurrent sinusitis/rhinitis, swallowing difficulty, hearing or visual changes recently.  No h/o arterial/venous thrombosis in the past. Denies any tick bite, recent GI/ infection.     April 20, 2021 - She has tried Cevimeline and that has helped her significantly with sicca symptoms. She use gel at night for her eyes which has helped with her eyes. No new abrasion or infection in her eyes. Hands, back and hips have pain. She walk everyday. She has pain in her back, MRI of L spine showed DDD. She has seen Orthopedics at [facility] and was recommended to do PT. She gets pain in her DIP joints, right wrist pain. Both hips hurt, right hip more during the day. Denies any pain in her knees, feet. Minor salivary gland lip biopsy done on 3/26/21 showed 2 focus of lymphoplasmacytic infiltrate of 50 lymphocyte along with chronic fibrosis. X ray of Hands, wrists mostly showed degenerative arthritis. Autoimmune work up done on 3/24/2021 showed negative SARAY, JORGE LUIS panel, rheumatoid factor, anti-CCP, normal CBC, CMP, ESR, CRP.    October 28, 2021 - Eyes are better with use of lubricating eye drops and eye ointment. Cevimeline has helped with dry mouth. She do Lower back and hand exercises which helps. She take 2 tylenol arthritis and Ibuprofen as needed. In the last few months she has noticed easy bruising. Also get flushing, increased sweating. It was present even before starting Cevimeline and seems to  have worsened. Reports stiffness in her neck. She has lumbar DDD. Fatigue has not improved.     January 4, 2023 - April to June she was sick with COVID. Recently she had tooth removed due to infection. She reports night sweats. She feel that sugar has impact on her hand pain. She denies significant pain in her joints now. She use Ibuprofen as needed which helps. She use cystane ointment for dry eyes and Cevimeline for dry mouth.          Review of Systems:     Review Of Systems  Constitutional: denies fever, chills, night sweats and weight loss.  Skin: No skin rash.  Eyes: + dryness or irritation in eyes. No episode of eye inflammation or redness.   Ears/Nose/Throat: no recurrent sinus infections.  Respiratory: No shortness of breath, dyspnea on exertion, cough, or hemoptysis  Cardiovascular: no chest pain or palpitations.  Gastrointestinal: no nausea, vomiting, abdominal pain.  Normal bowel movements.  Genitourinary: no dysuria, frequency  or hematuria.  Musculoskeletal: as in HPI  Neurologic: no numbness, tingling.  Psychiatric: no mood disorders.  Hematologic/Lymphatic/Immunologic: no history of easy bruising, petechia or purpura.  No abnormal bleeding.   Endocrine: +  h/o thyroid disease or Diabetes.                  Past Medical History:     Past Medical History:   Diagnosis Date     Primary osteoarthritis involving multiple joints      Sicca syndrome (H)      Past Surgical History:   Procedure Laterality Date     CHOLECYSTECTOMY       HYSTERECTOMY TOTAL ABDOMINAL              Social History:     Social History     Occupational History     Not on file   Tobacco Use     Smoking status: Never     Smokeless tobacco: Never   Substance and Sexual Activity     Alcohol use: Yes     Comment: socially     Drug use: Never     Sexual activity: Not Currently     Partners: Male            Family History:     Family History   Problem Relation Age of Onset     Vasculitis Father      Raynaud syndrome Sister      Lupus Niece              Allergies:     Allergies   Allergen Reactions     Diclofenac Shortness Of Breath     After using gel on joints of hands she became SOB with lightheadedness     Penicillins Hives     Tetracycline Hives            Medications:     Current Outpatient Medications   Medication Sig Dispense Refill     Calcium-Magnesium-Vitamin D (CALCIUM 1200+D3 PO)        cevimeline (EVOXAC) 30 MG capsule Take 1 capsule (30 mg) by mouth 3 times daily 270 capsule 3     levothyroxine (SYNTHROID/LEVOTHROID) 112 MCG tablet Take 112 mcg by mouth       MAGNESIUM CITRATE PO        Multiple Vitamin (MULTIVITAMIN ADULT PO)        Multiple Vitamins-Minerals (ICAPS AREDS FORMULA PO) Take 2 tablets by mouth daily       Omega-3 Fatty Acids (FISH OIL) 875 MG CAPS        polyethyl glycol-propyl glycol (SYSTANE) 0.4-0.3 % GEL        vitamin D3 (CHOLECALCIFEROL) 50 mcg (2000 units) tablet Take 1 tablet by mouth daily       cycloSPORINE (RESTASIS) 0.05 % ophthalmic emulsion INSTILL 1 DROP INTO BOTH EYES TWICE A DAY       sulfamethoxazole-trimethoprim (BACTRIM DS) 800-160 MG tablet Take 1 tablet by mouth 2 times daily 6 tablet 0     Turmeric (QC TUMERIC COMPLEX PO)  (Patient not taking: Reported on 10/28/2021)              Physical Exam:   Vitals not taken.   Wt Readings from Last 4 Encounters:   10/28/21 71.4 kg (157 lb 4.8 oz)       Constitutional: Moderately built, appearing stated age; cooperative  MS: Right index finger DIP joint, thumb IP joint hurt. Heberden nodes over the DIP joint. R index, middle PIP giovana node. Right wrist, CMC joint hurt. Right wrist ROM is limited. No pain in her elbows, shoulders, knees, ankles. Pain in her big toes, lower back.     Psych: nl judgement, orientation, memory, affect.         Data:     No results found for any visits on 01/04/23.    Recent Labs   Lab Test 12/21/20 12/20/19   TSH 0.39 0.77     Hemoglobin   Date Value Ref Range Status   10/28/2021 13.5 11.7 - 15.7 g/dL Final   03/24/2021 14.5 11.7 -  15.7 g/dL Final     Urea Nitrogen   Date Value Ref Range Status   10/28/2021 21 7 - 30 mg/dL Final     Sed Rate   Date Value Ref Range Status   03/24/2021 27 0 - 30 mm/h Final     Erythrocyte Sedimentation Rate   Date Value Ref Range Status   10/28/2021 20 0 - 30 mm/hr Final     CRP Inflammation   Date Value Ref Range Status   10/28/2021 7.8 0.0 - 8.0 mg/L Final   03/24/2021 3.4 0.0 - 8.0 mg/L Final     AST   Date Value Ref Range Status   10/28/2021 25 0 - 45 U/L Final   03/24/2021 32 0 - 45 U/L Final   12/20/2019 31 2 - 40 IU/L Final     Albumin   Date Value Ref Range Status   10/28/2021 3.8 3.4 - 5.0 g/dL Final   03/24/2021 4.0 3.4 - 5.0 g/dL Final     Alkaline Phosphatase   Date Value Ref Range Status   10/28/2021 100 40 - 150 U/L Final     ALT   Date Value Ref Range Status   10/28/2021 33 0 - 50 U/L Final   03/24/2021 47 0 - 50 U/L Final   12/20/2019 21 8 - 45 IU/L Final     Rheumatoid Factor   Date Value Ref Range Status   03/24/2021 <7 <12 IU/mL Final     Recent Labs   Lab Test 10/28/21  0920 03/24/21  1016 12/21/20  0000 12/20/19  0000   WBC 4.8 8.5  --   --    HGB 13.5 14.5  --   --    HCT 41.6 44.6  --   --    MCV 92 91  --   --     324  --   --    BUN 21  --   --   --    TSH 1.21  --  0.39 0.77   AST 25 32  --  31   ALT 33 47  --  21   ALKPHOS 100  --   --   --      Out side labs :     MRI L spine 12/2019  IMPRESSION:  1. L4-L5 right foraminal/extraforaminal disc extrusion displaces and likely  impinges the exiting right L4 spinal nerve.  2. Multilevel lumbar spondylosis.  3. Moderate right and mild to moderate left neural foraminal stenosis at L4-L5.  4. Mild to moderate spinal canal stenosis at L4-L5. Mild spinal canal stenosis  at L3-L4.      EXAM: XR PELVIS 1 VIEW AP AND HIP 2 VIEW BILATERAL    INDICATION: Chronic Midline Low Back Pain With Right-sided Sciatica Chronic  Midline Low Back Pain With Right-sided Sciatica  COMPARISON: None.    FINDINGS: Mild degenerative osteoarthritis of the right  femoral acetabular joint  with formation of small marginal osteophytes. Left femoral acetabular joint is  intact. No acute fracture or dislocation.  Reviewed Rheumatology lab flowsheet    Assessment     Sicca symptoms  Minor salivary and lip biopsy-3/26/2021- 2 foci of lymphoplasmacytic infiltrate with more than 50 cells are noted along with focal fibrosis.  Polyarticular osteoarthritis  History of positive anti-CCP antibody - 61 - 12/2020, negative later   Negative rheumatoid factor, normal ESR, CRP, negative SARAY    Severe sicca symptoms:  she has sicca symptoms since 2013. She has developed tooth decay due to dry mouth.  Her Sjogren serologies have been negative.  Repeat autoimmune work-up done in 3/2021 is normal.  But minor salivary gland lip biopsy showed 2 foci of lymphoplasmacytic infiltrate with more than 50 cells suggestive of Sjogren's.  She has  seronegative Sjogren's based on the biopsy.  Severe sicca symptoms have responded to cevimeline.  She has normal CBC, liver, kidney function test and urine analysis.  No evidence of systemic manifestations of Sjogren's disease is noted.  No need of immune suppression.    Polyarticular arthritis: She reports pain in her joints mostly in lower back, hips, wrists for last 10 years.  MRI of the lumbar spine in 12/19 has shown multilevel degenerative disc disease.  X-ray of her hands, hips in the past has shown mostly degenerative changes.  She has evidence of Heberden nodes over PIP DIP joints and reports tenderness over the first CMC joint.  Repeat rheumatoid serologies, inflammatory markers are normal.  X-rays of bilateral hand and wrist done on 3/24/2021 showed polyarticular osteoarthritis.  No evidence of inflammatory arthritis or erosions noted.      For osteoarthritis she can use Tylenol arthritis 1 to 2 tablets a day along with NSAIDs like ibuprofen 400-600 mg 2-3 times a day on as-needed basis. In addition Voltaren gel, compression gloves and Shashank emu cream.      Chronic Fatigue : Fatigue can be multifactorial . Common symptoms in patients with Sjogren's disease.  Her recent serum TSH, B12, vitamin D level done in 6/2022 were normal.  She is uptodate with her colonoscopy and Mammograms.  For chronic fatigue we recommend daily exercise, better sleep, healthy diet.      Plan     Joint pain related to polyarticular osteoarthritis.    Seronegative Sjogren's is not causing any systemic manifestations.  No need of immune suppression.    Continue cevimeline 30 mg 3-4 times a day for sicca symptoms    Blood test orders placed baseline      Garcia Chatman MD  [facility]  Department of Rheumatology & Autoimmune Disorders  [facility]: [phone]  Pager - [phone]     Video-Visit Details    Type of service:  Video Visit   Video Start Time: 9:06 AM   Video End Time:9:34 AM    Originating Location (pt. Location): Home    Distant Location (provider location):  Off-site  Platform used for Video Visit: [employer]

## 2023-01-04 ENCOUNTER — VIRTUAL VISIT (OUTPATIENT)
Dept: RHEUMATOLOGY | Facility: CLINIC | Age: 68
End: 2023-01-04
Payer: MEDICARE

## 2023-01-04 DIAGNOSIS — M19.041 PRIMARY OSTEOARTHRITIS OF BOTH HANDS: ICD-10-CM

## 2023-01-04 DIAGNOSIS — M19.042 PRIMARY OSTEOARTHRITIS OF BOTH HANDS: ICD-10-CM

## 2023-01-04 DIAGNOSIS — M35.00 SJOGREN'S SYNDROME, WITH UNSPECIFIED ORGAN INVOLVEMENT (H): Primary | ICD-10-CM

## 2023-01-04 PROCEDURE — 99214 OFFICE O/P EST MOD 30 MIN: CPT | Mod: 95 | Performed by: STUDENT IN AN ORGANIZED HEALTH CARE EDUCATION/TRAINING PROGRAM

## 2023-01-05 ENCOUNTER — LAB (OUTPATIENT)
Dept: LAB | Facility: CLINIC | Age: 68
End: 2023-01-05
Payer: MEDICARE

## 2023-01-05 DIAGNOSIS — M19.042 PRIMARY OSTEOARTHRITIS OF BOTH HANDS: ICD-10-CM

## 2023-01-05 DIAGNOSIS — M35.00 SJOGREN'S SYNDROME, WITH UNSPECIFIED ORGAN INVOLVEMENT (H): ICD-10-CM

## 2023-01-05 DIAGNOSIS — M19.041 PRIMARY OSTEOARTHRITIS OF BOTH HANDS: ICD-10-CM

## 2023-01-05 LAB
ALBUMIN SERPL BCG-MCNC: 4.2 G/DL (ref 3.5–5.2)
ALT SERPL W P-5'-P-CCNC: 13 U/L (ref 10–35)
AST SERPL W P-5'-P-CCNC: 24 U/L (ref 10–35)
CREAT SERPL-MCNC: 0.95 MG/DL (ref 0.51–0.95)
CRP SERPL-MCNC: <3 MG/L
ERYTHROCYTE [DISTWIDTH] IN BLOOD BY AUTOMATED COUNT: 13 % (ref 10–15)
ERYTHROCYTE [SEDIMENTATION RATE] IN BLOOD BY WESTERGREN METHOD: 25 MM/HR (ref 0–20)
GFR SERPL CREATININE-BSD FRML MDRD: 65 ML/MIN/1.73M2
HCT VFR BLD AUTO: 41.5 % (ref 35–47)
HGB BLD-MCNC: 13.7 G/DL (ref 11.7–15.7)
MCH RBC QN AUTO: 30.2 PG (ref 26.5–33)
MCHC RBC AUTO-ENTMCNC: 33 G/DL (ref 31.5–36.5)
MCV RBC AUTO: 92 FL (ref 78–100)
PLATELET # BLD AUTO: 238 10E3/UL (ref 150–450)
RBC # BLD AUTO: 4.53 10E6/UL (ref 3.8–5.2)
WBC # BLD AUTO: 5.2 10E3/UL (ref 4–11)

## 2023-01-05 PROCEDURE — 85027 COMPLETE CBC AUTOMATED: CPT

## 2023-01-05 PROCEDURE — 86160 COMPLEMENT ANTIGEN: CPT | Mod: 59

## 2023-01-05 PROCEDURE — 84450 TRANSFERASE (AST) (SGOT): CPT

## 2023-01-05 PROCEDURE — 36415 COLL VENOUS BLD VENIPUNCTURE: CPT

## 2023-01-05 PROCEDURE — 84460 ALANINE AMINO (ALT) (SGPT): CPT

## 2023-01-05 PROCEDURE — 86160 COMPLEMENT ANTIGEN: CPT

## 2023-01-05 PROCEDURE — 85652 RBC SED RATE AUTOMATED: CPT

## 2023-01-05 PROCEDURE — 82040 ASSAY OF SERUM ALBUMIN: CPT

## 2023-01-05 PROCEDURE — 86140 C-REACTIVE PROTEIN: CPT

## 2023-01-05 PROCEDURE — 82565 ASSAY OF CREATININE: CPT

## 2023-01-06 LAB
C3 SERPL-MCNC: 103 MG/DL (ref 81–157)
C4 SERPL-MCNC: 31 MG/DL (ref 13–39)

## 2023-04-09 ENCOUNTER — HEALTH MAINTENANCE LETTER (OUTPATIENT)
Age: 68
End: 2023-04-09

## 2023-12-28 NOTE — PROGRESS NOTES
Nicolasa is a 68 year old who is being evaluated via a billable video visit.      How would you like to obtain your AVS? MyChart  If the video visit is dropped, the invitation should be resent by: Text to cell phone: [phone]  Will anyone else be joining your video visit? No        Video-Visit Details    Type of service:  Video Visit   Video Start Time: 11:37 AM  Video End Time:12:00 PM    Originating Location (pt. Location): Home    Distant Location (provider location):  Off-site  Platform used for Video Visit: [place]           Active Problem List:     Patient Active Problem List    Diagnosis Date Noted    Primary osteoarthritis involving multiple joints      Priority: Medium            History of Present Illness:   Nicolasa Vogel is a 68 year old female with PMH of sicca symptoms, Osteoarthritis, meningioma, hypothyroidism followed by management of Sjogren's disease.      History from initial visit  : She has pain in her joints for more than 10 years.  Sometimes they come as waves when she has more pain in the joints for couple weeks and then it improves.  Mostly involves hips, low back, thighs.  She was evaluated by rheumatologist Dr. Fung in 2014 and then by Dr. Justice in 2020.  Her autoimmune work-up in the past has shown negative SARAY, JORGE LUIS panel, rheumatoid serologies.  Last year her anti-CCP antibody was elevated.  But as per previous rheumatologist's note no evidence of synovitis was noted over her joints.  Mostly degenerative changes were noted.  She has done physical therapy for hip and low back pain.  She tries to be active and walk 4 to 5 miles a day.    Right index finger DIP joint, thumb IP joint hurt. She does not knit any more. Heberden nodes present over the DIP joint. Right wrist ROM is limited. No pain in her elbows, shoulders, knees, ankles. Pain in her big toes, lower back.     Her other main concern is sicca symptoms.  She has dry eyes dry mouth since 2013 and has developed tooth decay  secondary to dry mouth.  She follows up with a dentist and ophthalmologist to manage sicca symptoms. Dry eyes have become worse since last fall. When she wake up she put drops in her eyes.  Her PCP wonder about Sjogren's and RA.     She has noticed that her scalp hurts when washing her hair. She has more bloating, constipation. She has chronic allergies. Her sister has Raynaud's, niece has Lupus. Dad had wegener's    No history of psoriasis, ulcerative colitis or chron's disease. No h/o iritis, enthesitis, finger or toe swelling like dactylitis, plantar fascitis or heel pain. Denies any raynauds, malar rash, photosensitivity, recurrent mouth/genital ulcers, pleuritic chest pains, recurrent sinusitis/rhinitis, swallowing difficulty, hearing or visual changes recently.  No h/o arterial/venous thrombosis in the past. Denies any tick bite, recent GI/ infection.     April 20, 2021 - She has tried Cevimeline and that has helped her significantly with sicca symptoms. She use gel at night for her eyes which has helped with her eyes. No new abrasion or infection in her eyes. Hands, back and hips have pain. She walk everyday. She has pain in her back, MRI of L spine showed DDD. She has seen Orthopedics at [facility] and was recommended to do PT. She gets pain in her DIP joints, right wrist pain. Both hips hurt, right hip more during the day. Denies any pain in her knees, feet. Minor salivary gland lip biopsy done on 3/26/21 showed 2 focus of lymphoplasmacytic infiltrate of 50 lymphocyte along with chronic fibrosis. X ray of Hands, wrists mostly showed degenerative arthritis. Autoimmune work up done on 3/24/2021 showed negative SARAY, JORGE LUIS panel, rheumatoid factor, anti-CCP, normal CBC, CMP, ESR, CRP.    October 28, 2021 - Eyes are better with use of lubricating eye drops and eye ointment. Cevimeline has helped with dry mouth. She do Lower back and hand exercises which helps. She take 2 tylenol arthritis and Ibuprofen  as needed. In the last few months she has noticed easy bruising. Also get flushing, increased sweating. It was present even before starting Cevimeline and seems to have worsened. Reports stiffness in her neck. She has lumbar DDD. Fatigue has not improved.     January 4, 2023 - April to June she was sick with COVID. Recently she had tooth removed due to infection. She reports night sweats. She feel that sugar has impact on her hand pain. She denies significant pain in her joints now. She use Ibuprofen as needed which helps. She use cystane ointment for dry eyes and Cevimeline for dry mouth.     January 2, 2024 - She has been having fluttering of her heart which was evident on heart monitor. She will be seeing cardiologist in 2/2024.  In 11/23 she had bilateral cataract surgery.  Few days after surgery she developed right-sided inflammation.  On follow-up with ophthalmologist surgical fiber was found in her eye which was then removed by ophthalmologist.  On December 25 her left eye got inflamed.  She is using steroid eyedrops at present and will be following up with ophthalmologist tomorrow.  She has noticed improvements in her joint pains.  Her diet has improved and she is slightly more physically active.  But her fatigue has not significantly changed.         Review of Systems:     Review Of Systems  Constitutional: denies fever, chills, night sweats and weight loss.  Skin: No skin rash.  Eyes: + dryness or irritation in eyes. No episode of eye inflammation or redness.   Ears/Nose/Throat: no recurrent sinus infections.  Respiratory: No shortness of breath, dyspnea on exertion, cough, or hemoptysis  Cardiovascular: no chest pain or palpitations.  Gastrointestinal: no nausea, vomiting, abdominal pain.  Normal bowel movements.  Genitourinary: no dysuria, frequency  or hematuria.  Musculoskeletal: as in HPI  Neurologic: no numbness, tingling.  Psychiatric: no mood disorders.  Hematologic/Lymphatic/Immunologic: no  history of easy bruising, petechia or purpura.  No abnormal bleeding.   Endocrine: +  h/o thyroid disease or Diabetes.                  Past Medical History:     Past Medical History:   Diagnosis Date    Primary osteoarthritis involving multiple joints     Sicca syndrome (H)      Past Surgical History:   Procedure Laterality Date    CHOLECYSTECTOMY      HYSTERECTOMY TOTAL ABDOMINAL              Social History:     Social History     Occupational History    Not on file   Tobacco Use    Smoking status: Never    Smokeless tobacco: Never   Substance and Sexual Activity    Alcohol use: Yes     Comment: socially    Drug use: Never    Sexual activity: Not Currently     Partners: Male            Family History:     Family History   Problem Relation Age of Onset    Vasculitis Father     Raynaud syndrome Sister     Lupus Niece             Allergies:     Allergies   Allergen Reactions    Diclofenac Shortness Of Breath     After using gel on joints of hands she became SOB with lightheadedness    Penicillins Hives    Tetracycline Hives            Medications:     Current Outpatient Medications   Medication Sig Dispense Refill    Calcium-Magnesium-Vitamin D (CALCIUM 1200+D3 PO)       cevimeline (EVOXAC) 30 MG capsule Take 1 capsule (30 mg) by mouth 3 times daily 270 capsule 3    levothyroxine (SYNTHROID/LEVOTHROID) 112 MCG tablet Take 112 mcg by mouth      MAGNESIUM CITRATE PO       Multiple Vitamin (MULTIVITAMIN ADULT PO)       Multiple Vitamins-Minerals (ICAPS AREDS FORMULA PO) Take 2 tablets by mouth daily      Omega-3 Fatty Acids (FISH OIL) 875 MG CAPS       polyethyl glycol-propyl glycol (SYSTANE) 0.4-0.3 % GEL       vitamin D3 (CHOLECALCIFEROL) 50 mcg (2000 units) tablet Take 1 tablet by mouth daily              Physical Exam:   Vitals not taken.   Wt Readings from Last 4 Encounters:   10/28/21 71.4 kg (157 lb 4.8 oz)       Constitutional: Moderately built, appearing stated age; cooperative  MS: No tenderness reported over  MCP, PIP joints.  Heberden's nodes over the DIP, right index noted.  Reji nodes over right middle PIP joint.  Right wrist ROM is limited. No pain in her elbows, shoulders, knees, ankles. Pain in her big toes, lower back.     Psych: nl judgement, orientation, memory, affect.         Data:     No results found for any visits on 01/02/24.    Recent Labs   Lab Test 12/21/20 12/20/19   TSH 0.39 0.77     Hemoglobin   Date Value Ref Range Status   01/05/2023 13.7 11.7 - 15.7 g/dL Final   10/28/2021 13.5 11.7 - 15.7 g/dL Final   03/24/2021 14.5 11.7 - 15.7 g/dL Final     Urea Nitrogen   Date Value Ref Range Status   10/28/2021 21 7 - 30 mg/dL Final     Sed Rate   Date Value Ref Range Status   03/24/2021 27 0 - 30 mm/h Final     Erythrocyte Sedimentation Rate   Date Value Ref Range Status   01/05/2023 25 (H) 0 - 20 mm/hr Final   10/28/2021 20 0 - 30 mm/hr Final     CRP Inflammation   Date Value Ref Range Status   10/28/2021 7.8 0.0 - 8.0 mg/L Final   03/24/2021 3.4 0.0 - 8.0 mg/L Final     AST   Date Value Ref Range Status   01/05/2023 24 10 - 35 U/L Final   10/28/2021 25 0 - 45 U/L Final   03/24/2021 32 0 - 45 U/L Final   12/20/2019 31 2 - 40 IU/L Final     Albumin   Date Value Ref Range Status   01/05/2023 4.2 3.5 - 5.2 g/dL Final   10/28/2021 3.8 3.4 - 5.0 g/dL Final   03/24/2021 4.0 3.4 - 5.0 g/dL Final     Alkaline Phosphatase   Date Value Ref Range Status   10/28/2021 100 40 - 150 U/L Final     ALT   Date Value Ref Range Status   01/05/2023 13 10 - 35 U/L Final   10/28/2021 33 0 - 50 U/L Final   03/24/2021 47 0 - 50 U/L Final   12/20/2019 21 8 - 45 IU/L Final     Rheumatoid Factor   Date Value Ref Range Status   03/24/2021 <7 <12 IU/mL Final     Recent Labs   Lab Test 01/05/23  0928 10/28/21  0920 03/24/21  1016 12/21/20  0000 12/20/19  0000   WBC 5.2 4.8 8.5  --   --    HGB 13.7 13.5 14.5  --   --    HCT 41.5 41.6 44.6  --   --    MCV 92 92 91  --   --     278 324  --   --    BUN  --  21  --   --   --     TSH  --  1.21  --  0.39 0.77   AST 24 25 32  --  31   ALT 13 33 47  --  21   ALKPHOS  --  100  --   --   --      Out side labs :     MRI L spine 12/2019  IMPRESSION:  1. L4-L5 right foraminal/extraforaminal disc extrusion displaces and likely  impinges the exiting right L4 spinal nerve.  2. Multilevel lumbar spondylosis.  3. Moderate right and mild to moderate left neural foraminal stenosis at L4-L5.  4. Mild to moderate spinal canal stenosis at L4-L5. Mild spinal canal stenosis  at L3-L4.      EXAM: XR PELVIS 1 VIEW AP AND HIP 2 VIEW BILATERAL    INDICATION: Chronic Midline Low Back Pain With Right-sided Sciatica Chronic  Midline Low Back Pain With Right-sided Sciatica  COMPARISON: None.    FINDINGS: Mild degenerative osteoarthritis of the right femoral acetabular joint  with formation of small marginal osteophytes. Left femoral acetabular joint is  intact. No acute fracture or dislocation.  Reviewed Rheumatology lab flowsheet    Assessment     Sicca symptoms  Minor salivary and lip biopsy-3/26/2021- 2 foci of lymphoplasmacytic infiltrate with more than 50 cells are noted along with focal fibrosis.  Polyarticular osteoarthritis  History of positive anti-CCP antibody - 61 - 12/2020, negative later   Negative rheumatoid factor, normal ESR, CRP, negative SARAY    Severe sicca symptoms:  she has sicca symptoms since 2013 and has developed tooth decay due to dry mouth.  Her Sjogren serologies have been negative.  Repeat autoimmune work-up done in 3/2021 is normal.  But minor salivary gland lip biopsy showed 2 foci of lymphoplasmacytic infiltrate with more than 50 cells suggestive of Sjogren's.  She has  seronegative Sjogren's based on the biopsy.  Severe sicca symptoms have responded to cevimeline.  She has normal CBC, liver, kidney function test and urine analysis.  No evidence of systemic manifestations of Sjogren's disease is noted.  No need of immune suppression.    Polyarticular arthritis: She reports pain in her  joints mostly in lower back, hips, wrists for last 10 years.  MRI of the lumbar spine in 12/19 has shown multilevel degenerative disc disease.  X-ray of her hands, hips in the past has shown mostly degenerative changes.  She has evidence of Heberden nodes over PIP DIP joints and reports tenderness over the first CMC joint.  Repeat rheumatoid serologies, inflammatory markers are normal.  X-rays of bilateral hand and wrist done on 3/24/2021 showed polyarticular osteoarthritis.  No evidence of inflammatory arthritis or erosions noted.      For osteoarthritis she can use Tylenol arthritis 1 to 2 tablets a day along with NSAIDs like ibuprofen 400-600 mg 2-3 times a day on as-needed basis. In addition Voltaren gel, compression gloves and Shashank emu cream.     Chronic Fatigue : Fatigue can be multifactorial . It is a common symptom in patients with Sjogren's disease.  Her recent serum TSH, B12 level done in 6/2023 were normal.  Total vitamin D, AST, ALT, C3-C4 levels.  She is uptodate with her colonoscopy and Mammograms.  She is concerned about increased risk of lymphoma in patients with Sjogren's.  Recent CT abdomen pelvis and chest x-ray done last year did not show any evidence of lymphadenopathy.  She denies recurrent fever, weight loss, night sweats.  For chronic fatigue we recommend daily exercise, better sleep, healthy diet.      Plan     Joint pain related to polyarticular osteoarthritis.    Seronegative Sjogren's is not causing any systemic manifestations.  No need of immune suppression.    Continue cevimeline 30 mg 3-4 times a day for sicca symptoms    Blood tests orders placed.     Follow up in a year       Garcia Chatman MD  [facility]  Department of Rheumatology & Autoimmune Disorders  [facility]: [phone]  Pager - [phone]

## 2024-01-02 ENCOUNTER — VIRTUAL VISIT (OUTPATIENT)
Dept: RHEUMATOLOGY | Facility: CLINIC | Age: 69
End: 2024-01-02
Payer: MEDICARE

## 2024-01-02 DIAGNOSIS — R76.8 CYCLIC CITRULLINATED PEPTIDE (CCP) ANTIBODY POSITIVE: ICD-10-CM

## 2024-01-02 DIAGNOSIS — M19.042 PRIMARY OSTEOARTHRITIS OF BOTH HANDS: ICD-10-CM

## 2024-01-02 DIAGNOSIS — M35.00 SJOGREN'S SYNDROME, WITH UNSPECIFIED ORGAN INVOLVEMENT (H): Primary | ICD-10-CM

## 2024-01-02 DIAGNOSIS — M19.041 PRIMARY OSTEOARTHRITIS OF BOTH HANDS: ICD-10-CM

## 2024-01-02 DIAGNOSIS — M35.00 SICCA SYNDROME (H): ICD-10-CM

## 2024-01-02 DIAGNOSIS — E66.89 OTHER OBESITY: ICD-10-CM

## 2024-01-02 PROCEDURE — 99214 OFFICE O/P EST MOD 30 MIN: CPT | Mod: 95 | Performed by: STUDENT IN AN ORGANIZED HEALTH CARE EDUCATION/TRAINING PROGRAM

## 2024-01-02 RX ORDER — CEVIMELINE HYDROCHLORIDE 30 MG/1
30 CAPSULE ORAL 3 TIMES DAILY
Qty: 270 CAPSULE | Refills: 3 | Status: SHIPPED | OUTPATIENT
Start: 2024-01-02

## 2024-01-02 NOTE — PATIENT INSTRUCTIONS
Joint pain related to polyarticular osteoarthritis.    Seronegative Sjogren's is not causing any systemic manifestations.  No need of immune suppression.    Continue cevimeline 30 mg 3-4 times a day for sicca symptoms    Blood tests orders placed.     Follow up in a year

## 2024-01-04 ENCOUNTER — LAB (OUTPATIENT)
Dept: LAB | Facility: CLINIC | Age: 69
End: 2024-01-04
Payer: MEDICARE

## 2024-01-04 DIAGNOSIS — E66.89 OTHER OBESITY: ICD-10-CM

## 2024-01-04 DIAGNOSIS — M19.042 PRIMARY OSTEOARTHRITIS OF BOTH HANDS: ICD-10-CM

## 2024-01-04 DIAGNOSIS — M19.041 PRIMARY OSTEOARTHRITIS OF BOTH HANDS: ICD-10-CM

## 2024-01-04 DIAGNOSIS — M35.00 SJOGREN'S SYNDROME, WITH UNSPECIFIED ORGAN INVOLVEMENT (H): ICD-10-CM

## 2024-01-04 PROCEDURE — 82306 VITAMIN D 25 HYDROXY: CPT

## 2024-01-04 PROCEDURE — 84460 ALANINE AMINO (ALT) (SGPT): CPT

## 2024-01-04 PROCEDURE — 84450 TRANSFERASE (AST) (SGOT): CPT

## 2024-01-04 PROCEDURE — 36415 COLL VENOUS BLD VENIPUNCTURE: CPT

## 2024-01-04 PROCEDURE — 86160 COMPLEMENT ANTIGEN: CPT

## 2024-01-05 LAB
ALT SERPL W P-5'-P-CCNC: 26 U/L (ref 0–50)
AST SERPL W P-5'-P-CCNC: 31 U/L (ref 0–45)
C3 SERPL-MCNC: 100 MG/DL (ref 81–157)
C4 SERPL-MCNC: 27 MG/DL (ref 13–39)
VIT D+METAB SERPL-MCNC: 63 NG/ML (ref 20–50)

## 2024-02-15 ENCOUNTER — OFFICE VISIT (OUTPATIENT)
Dept: CARDIOLOGY | Facility: CLINIC | Age: 69
End: 2024-02-15
Payer: MEDICARE

## 2024-02-15 VITALS
HEIGHT: 69 IN | RESPIRATION RATE: 14 BRPM | SYSTOLIC BLOOD PRESSURE: 110 MMHG | DIASTOLIC BLOOD PRESSURE: 64 MMHG | HEART RATE: 76 BPM | WEIGHT: 146 LBS | BODY MASS INDEX: 21.62 KG/M2

## 2024-02-15 DIAGNOSIS — G31.9 CEREBRAL ATROPHY (H): ICD-10-CM

## 2024-02-15 DIAGNOSIS — R00.2 PALPITATIONS: Primary | ICD-10-CM

## 2024-02-15 DIAGNOSIS — I47.10 PAROXYSMAL SUPRAVENTRICULAR TACHYCARDIA (H): ICD-10-CM

## 2024-02-15 DIAGNOSIS — I49.3 PVC'S (PREMATURE VENTRICULAR CONTRACTIONS): ICD-10-CM

## 2024-02-15 LAB
ATRIAL RATE - MUSE: 71 BPM
DIASTOLIC BLOOD PRESSURE - MUSE: NORMAL MMHG
INTERPRETATION ECG - MUSE: NORMAL
P AXIS - MUSE: 50 DEGREES
PR INTERVAL - MUSE: 140 MS
QRS DURATION - MUSE: 92 MS
QT - MUSE: 394 MS
QTC - MUSE: 428 MS
R AXIS - MUSE: -64 DEGREES
SYSTOLIC BLOOD PRESSURE - MUSE: NORMAL MMHG
T AXIS - MUSE: 48 DEGREES
VENTRICULAR RATE- MUSE: 71 BPM

## 2024-02-15 PROCEDURE — 99204 OFFICE O/P NEW MOD 45 MIN: CPT | Performed by: GENERAL ACUTE CARE HOSPITAL

## 2024-02-15 PROCEDURE — 93000 ELECTROCARDIOGRAM COMPLETE: CPT | Performed by: GENERAL ACUTE CARE HOSPITAL

## 2024-02-15 RX ORDER — NEOMYCIN SULFATE, POLYMYXIN B SULFATE AND DEXAMETHASONE 3.5; 10000; 1 MG/ML; [USP'U]/ML; MG/ML
1 SUSPENSION/ DROPS OPHTHALMIC DAILY PRN
COMMUNITY
Start: 2024-02-14

## 2024-02-15 NOTE — PATIENT INSTRUCTIONS
"We will schedule you for an echocardiogram to look for any structural heart abnormalities.  If your symptoms bother you enough, we can try a low dose of a heart medication either \"metoprolol\" or \"diltiazem\".  "

## 2024-02-15 NOTE — LETTER
2/15/2024    Josselin Cruz  [address]  [place] [address]    RE: Nicolasa Vogel       Dear Colleague,     I had the pleasure of seeing Nicolasa Vogel in the [facility].  HEART CARE ENCOUNTER NOTE      Thank you, Josselin Redmond, for asking the [facility] Heart Care team to see Ms. Nicolasa Vogel to evaluate palpitations.    Assessment/Recommendations   Assessment:    Palpitations which may be due to both paroxysmal supraventricular tachycardia premature ventricular contractions.  Occasional (1.2% burden) of premature ventricular contractions. These are likely benign etiology as she demonstrates no other signs or symptoms of heart disease.  Paroxysmal supraventricular tachycardia. Also likely benign with no obvious precipitating factor.  Mild diffuse cerebral parenchymal volume loss and presumed chronic microvascular ischemic white matter changes. This is likely age-related.    Plan:  Transthoracic echocardiogram.  If her transthoracic echocardiogram showed no abnormalities and she continues to deny exertional symptoms, I do not think she requires an ischemic evaluation.  If her palpitations become more bothersome, we can try a low dose of metoprolol or diltiazem.  Follow-up as needed based on test results and symptoms.         History of Present Illness   Ms. Nicolasa Vogel is a 68 year old female with a significant past history of hypothyroidism and seronegative Sjogren's disease presenting for evaluation of palpitations.    She has felt palpitations for about the last year. The symptoms were more intense during December and January, occurring multiple times per day but seem to have subsided now occurring maybe 3-4 times per week. Palpitations are typically brief and mild although a few episodes have been more intense. No associated symptoms and no precipitating factors although the palpitation seem more likely to occur at rest and she feels fine with  physical activity.    A recent Zio patch showed 2 short episodes of SVT as well as 1.2% PVCs which did seem to correlate with symptoms.    No exertional chest pain/pressure/tightness, shortness of breath at rest or with exertion, light headedness/dizziness, pre-syncope, syncope, lower extremity swelling, paroxysmal nocturnal dyspnea (PND), or orthopnea.    She has a small amount of caffeine daily. She has a small glass of wine several times per week. She does not feel wine or caffeine have an effect on her symptoms. She did contract COVID-19 about 2 years ago but feel her palpitations started well after this.    She also has a cerebral meningioma which has been monitored with MRI. Her last MRI also noted mild cerebral parenchymal volume loss and microvascular ischemic white matter changes.     Cardiac Problems and Cardiac Diagnostics     Most Recent Cardiac testing:  ECG dated 2/15/2024 (personaly reviewed and interpreted): SR, LAD, incomplete RBBB    Zio patch 12/4/2023 (report reviewed):  Patient was monitored from 11/17/2023 - 11/20/2023 for a total of 2 days 17 hours analyzable data.  Patient had a min HR of 50 bpm, max HR of 154 bpm, and avg HR of 80 bpm.  Predominant underlying rhythm was sinus rhythm.  2  supraventricular tachycardia runs occurred, the run with the fastest interval lasting 7 beats with a max rate of 132 bpm (avg 118 bpm); the run with the fastest interval was also the longest.  Isolated SVEs, couplets and triplets were rare (<1.0%).  Isolated VEs were occasional (1.2%). VE couplets were rare (<1.0%) and no triplets were present. Ventricular bigeminy and trigeminy were present.  Symptomatic events correlate with sinus rhythm/sinus tachycardia and heart rates between 69 and 119 bpm with PACs and PVCs.    MRI brain 2/14/2024 (report reviewed):  1.  8.6 x 15.1 x 9.5 mm presumed meningioma over the lateral left cerebellar convexity, measured 7.6 x 13 x 9.1 mm on 01/14/2022 and 6.8 x 12 x 7.7 mm on  "12/21/2017.   2.  Mild diffuse cerebral parenchymal volume loss. Presumed chronic hypertensive/microvascular ischemic white matter changes.   3.  No superimposed acute intracranial pathology.      Medications  Allergies   Current Outpatient Medications   Medication Sig Dispense Refill    Calcium-Magnesium-Vitamin D (CALCIUM 1200+D3 PO) Take 1 tablet by mouth daily as needed      cevimeline (EVOXAC) 30 MG capsule Take 1 capsule (30 mg) by mouth 3 times daily 270 capsule 3    levothyroxine (SYNTHROID/LEVOTHROID) 112 MCG tablet Take 112 mcg by mouth      MAGNESIUM CITRATE PO Take 1 tablet by mouth daily      Multiple Vitamin (MULTIVITAMIN ADULT PO) Take 1 tablet by mouth daily      Multiple Vitamins-Minerals (ICAPS AREDS FORMULA PO) Take 2 tablets by mouth daily      neomycin-polymyxin-dexAMETHasone (MAXITROL) 3.5-03968-5.1 SUSP ophthalmic susp Place 1 drop into both eyes daily as needed      Omega-3 Fatty Acids (FISH OIL) 875 MG CAPS Take 1 capsule by mouth daily as needed      polyethyl glycol-propyl glycol (SYSTANE) 0.4-0.3 % GEL Apply 1 drop to eye daily        Allergies   Allergen Reactions    Diclofenac Shortness Of Breath     After using gel on joints of hands she became SOB with lightheadedness    Sulfamethoxazole Hives    Penicillins Hives    Tetracycline Hives        Physical Examination Review of Systems   /64 (BP Location: Right arm, Patient Position: Sitting, Cuff Size: Adult Regular)   Pulse 76   Resp 14   Ht 1.74 m (5' 8.5\")   Wt 66.2 kg (146 lb)   BMI 21.88 kg/m    Body mass index is 21.88 kg/m .  Wt Readings from Last 3 Encounters:   02/15/24 66.2 kg (146 lb)   10/28/21 71.4 kg (157 lb 4.8 oz)       General Appearance:   Pleasant  female, appears  stated age. no acute distress, normal body habitus   ENT/Mouth: membranes moist, no apparent gingival bleeding.      EYES:  no scleral icterus, normal conjunctivae   Neck: no carotid bruits. No anterior cervical lymphadenopaty   Respiratory:   " lungs are clear to auscultation, no rales or wheezing, equal chest wall expansion    Cardiovascular:   Regular rhythm, normal rate. Normal first and second heart sounds with no murmurs, rubs, or gallops; the carotid, radial and posterior tibial pulses are intact, Jugular venous pressure normal, no edema bilaterally    Abdomen/GI:  no organomegaly, masses, bruits, or tenderness; bowel sounds are present   Extremities: no cyanosis or clubbing   Skin: no xanthelasma, warm.    Heme/lymph/ Immunology No apparent bleeding noted.   Neurologic: Alert and oriented. normal gait, no tremors     Psychiatric: Pleasant, calm, appropriate affect.    A complete 10 system review of systems was performed and is negative except as mentioned in the HPI/subjective.         Past History   Past Medical History:   Past Medical History:   Diagnosis Date    Primary osteoarthritis involving multiple joints     Sicca syndrome (H24)        Past Surgical History:   Past Surgical History:   Procedure Laterality Date    CHOLECYSTECTOMY      HYSTERECTOMY TOTAL ABDOMINAL         Family History:   Family History   Problem Relation Age of Onset    Vasculitis Father     Raynaud syndrome Sister     Lupus Niece         Social History:   Social History     Socioeconomic History    Marital status:      Spouse name: Not on file    Number of children: Not on file    Years of education: Not on file    Highest education level: Not on file   Occupational History    Not on file   Tobacco Use    Smoking status: Never    Smokeless tobacco: Never   Substance and Sexual Activity    Alcohol use: Yes     Comment: socially    Drug use: Never    Sexual activity: Not Currently     Partners: Male   Other Topics Concern    Not on file   Social History Narrative    Not on file     Social Determinants of Health     Financial Resource Strain: Not on file   Food Insecurity: Not on file   Transportation Needs: Not on file   Physical Activity: Not on file   Stress: Not on  file   Social Connections: Not on file   Interpersonal Safety: Not on file   Housing Stability: Not on file              Lab Results    Chemistry/lipid CBC Cardiac Enzymes/BNP/TSH/INR   Lab Results   Component Value Date    CR 0.95 01/05/2023    BUN 21 10/28/2021    POTASSIUM 4.4 10/28/2021     10/28/2021    CO2 32 10/28/2021    6/28/2023  CHOLESTEROL,TOTAL  100 - 199 mg/dL 196   Comment: Cholesterol, Total Reference Ranges    Desirable <200 mg/dL  Borderline 200-239 mg/dL  High >=240 mg/dL   TRIGLYCERIDES  <150 mg/dL 153 High    HDL CHOLESTEROL  >40 mg/dL 53   NON-HDL CHOLESTEROL  <145 mg/dl 143   CHOL/HDL RATIO  <4.50 3.70   LDL CHOLESTEROL  <=130 mg/dL 112   VLDL CHOLESTEROL  <=30 mg/dL 31 High       Lab Results   Component Value Date    WBC 5.2 01/05/2023    HGB 13.7 01/05/2023    HCT 41.5 01/05/2023    MCV 92 01/05/2023     01/05/2023      Lab Results   Component Value Date    TSH 1.21 10/28/2021    11/13/2023  TSH  0.27 - 4.20 uIU/mL 0.69          Santiago Marquez MD [facility]  Non-Invasive Cardiologist  [facility]  Pager [phone]      Thank you for allowing me to participate in the care of your patient.      Sincerely,     Santiago Marquez MD     [facility]  cc:   No referring provider defined for this encounter.

## 2024-02-15 NOTE — PROGRESS NOTES
HEART CARE ENCOUNTER NOTE      Thank you, Josselin Redmond, for asking the [facility] Heart Care team to see Ms. Nicolasa Vogel to evaluate palpitations.    Assessment/Recommendations   Assessment:    Palpitations which may be due to both paroxysmal supraventricular tachycardia premature ventricular contractions.  Occasional (1.2% burden) of premature ventricular contractions. These are likely benign etiology as she demonstrates no other signs or symptoms of heart disease.  Paroxysmal supraventricular tachycardia. Also likely benign with no obvious precipitating factor.  Mild diffuse cerebral parenchymal volume loss and presumed chronic microvascular ischemic white matter changes. This is likely age-related.    Plan:  Transthoracic echocardiogram.  If her transthoracic echocardiogram showed no abnormalities and she continues to deny exertional symptoms, I do not think she requires an ischemic evaluation.  If her palpitations become more bothersome, we can try a low dose of metoprolol or diltiazem.  Follow-up as needed based on test results and symptoms.         History of Present Illness   Ms. Nicolasa Vogel is a 68 year old female with a significant past history of hypothyroidism and seronegative Sjogren's disease presenting for evaluation of palpitations.    She has felt palpitations for about the last year. The symptoms were more intense during December and January, occurring multiple times per day but seem to have subsided now occurring maybe 3-4 times per week. Palpitations are typically brief and mild although a few episodes have been more intense. No associated symptoms and no precipitating factors although the palpitation seem more likely to occur at rest and she feels fine with physical activity.    A recent Zio patch showed 2 short episodes of SVT as well as 1.2% PVCs which did seem to correlate with symptoms.    No exertional chest pain/pressure/tightness, shortness of breath at rest or with  exertion, light headedness/dizziness, pre-syncope, syncope, lower extremity swelling, paroxysmal nocturnal dyspnea (PND), or orthopnea.    She has a small amount of caffeine daily. She has a small glass of wine several times per week. She does not feel wine or caffeine have an effect on her symptoms. She did contract COVID-19 about 2 years ago but feel her palpitations started well after this.    She also has a cerebral meningioma which has been monitored with MRI. Her last MRI also noted mild cerebral parenchymal volume loss and microvascular ischemic white matter changes.     Cardiac Problems and Cardiac Diagnostics     Most Recent Cardiac testing:  ECG dated 2/15/2024 (personaly reviewed and interpreted): SR, LAD, incomplete RBBB    Zio patch 12/4/2023 (report reviewed):  Patient was monitored from 11/17/2023 - 11/20/2023 for a total of 2 days 17 hours analyzable data.  Patient had a min HR of 50 bpm, max HR of 154 bpm, and avg HR of 80 bpm.  Predominant underlying rhythm was sinus rhythm.  2  supraventricular tachycardia runs occurred, the run with the fastest interval lasting 7 beats with a max rate of 132 bpm (avg 118 bpm); the run with the fastest interval was also the longest.  Isolated SVEs, couplets and triplets were rare (<1.0%).  Isolated VEs were occasional (1.2%). VE couplets were rare (<1.0%) and no triplets were present. Ventricular bigeminy and trigeminy were present.  Symptomatic events correlate with sinus rhythm/sinus tachycardia and heart rates between 69 and 119 bpm with PACs and PVCs.    MRI brain 2/14/2024 (report reviewed):  1.  8.6 x 15.1 x 9.5 mm presumed meningioma over the lateral left cerebellar convexity, measured 7.6 x 13 x 9.1 mm on 01/14/2022 and 6.8 x 12 x 7.7 mm on 12/21/2017.   2.  Mild diffuse cerebral parenchymal volume loss. Presumed chronic hypertensive/microvascular ischemic white matter changes.   3.  No superimposed acute intracranial pathology.      Medications  Allergies  "  Current Outpatient Medications   Medication Sig Dispense Refill    Calcium-Magnesium-Vitamin D (CALCIUM 1200+D3 PO) Take 1 tablet by mouth daily as needed      cevimeline (EVOXAC) 30 MG capsule Take 1 capsule (30 mg) by mouth 3 times daily 270 capsule 3    levothyroxine (SYNTHROID/LEVOTHROID) 112 MCG tablet Take 112 mcg by mouth      MAGNESIUM CITRATE PO Take 1 tablet by mouth daily      Multiple Vitamin (MULTIVITAMIN ADULT PO) Take 1 tablet by mouth daily      Multiple Vitamins-Minerals (ICAPS AREDS FORMULA PO) Take 2 tablets by mouth daily      neomycin-polymyxin-dexAMETHasone (MAXITROL) 3.5-20671-5.1 SUSP ophthalmic susp Place 1 drop into both eyes daily as needed      Omega-3 Fatty Acids (FISH OIL) 875 MG CAPS Take 1 capsule by mouth daily as needed      polyethyl glycol-propyl glycol (SYSTANE) 0.4-0.3 % GEL Apply 1 drop to eye daily        Allergies   Allergen Reactions    Diclofenac Shortness Of Breath     After using gel on joints of hands she became SOB with lightheadedness    Sulfamethoxazole Hives    Penicillins Hives    Tetracycline Hives        Physical Examination Review of Systems   /64 (BP Location: Right arm, Patient Position: Sitting, Cuff Size: Adult Regular)   Pulse 76   Resp 14   Ht 1.74 m (5' 8.5\")   Wt 66.2 kg (146 lb)   BMI 21.88 kg/m    Body mass index is 21.88 kg/m .  Wt Readings from Last 3 Encounters:   02/15/24 66.2 kg (146 lb)   10/28/21 71.4 kg (157 lb 4.8 oz)       General Appearance:   Pleasant  female, appears  stated age. no acute distress, normal body habitus   ENT/Mouth: membranes moist, no apparent gingival bleeding.      EYES:  no scleral icterus, normal conjunctivae   Neck: no carotid bruits. No anterior cervical lymphadenopaty   Respiratory:   lungs are clear to auscultation, no rales or wheezing, equal chest wall expansion    Cardiovascular:   Regular rhythm, normal rate. Normal first and second heart sounds with no murmurs, rubs, or gallops; the carotid, radial " and posterior tibial pulses are intact, Jugular venous pressure normal, no edema bilaterally    Abdomen/GI:  no organomegaly, masses, bruits, or tenderness; bowel sounds are present   Extremities: no cyanosis or clubbing   Skin: no xanthelasma, warm.    Heme/lymph/ Immunology No apparent bleeding noted.   Neurologic: Alert and oriented. normal gait, no tremors     Psychiatric: Pleasant, calm, appropriate affect.    A complete 10 system review of systems was performed and is negative except as mentioned in the HPI/subjective.         Past History   Past Medical History:   Past Medical History:   Diagnosis Date    Primary osteoarthritis involving multiple joints     Sicca syndrome (H24)        Past Surgical History:   Past Surgical History:   Procedure Laterality Date    CHOLECYSTECTOMY      HYSTERECTOMY TOTAL ABDOMINAL         Family History:   Family History   Problem Relation Age of Onset    Vasculitis Father     Raynaud syndrome Sister     Lupus Niece         Social History:   Social History     Socioeconomic History    Marital status:      Spouse name: Not on file    Number of children: Not on file    Years of education: Not on file    Highest education level: Not on file   Occupational History    Not on file   Tobacco Use    Smoking status: Never    Smokeless tobacco: Never   Substance and Sexual Activity    Alcohol use: Yes     Comment: socially    Drug use: Never    Sexual activity: Not Currently     Partners: Male   Other Topics Concern    Not on file   Social History Narrative    Not on file     Social Determinants of Health     Financial Resource Strain: Not on file   Food Insecurity: Not on file   Transportation Needs: Not on file   Physical Activity: Not on file   Stress: Not on file   Social Connections: Not on file   Interpersonal Safety: Not on file   Housing Stability: Not on file              Lab Results    Chemistry/lipid CBC Cardiac Enzymes/BNP/TSH/INR   Lab Results   Component Value Date     CR 0.95 01/05/2023    BUN 21 10/28/2021    POTASSIUM 4.4 10/28/2021     10/28/2021    CO2 32 10/28/2021    6/28/2023  CHOLESTEROL,TOTAL  100 - 199 mg/dL 196   Comment: Cholesterol, Total Reference Ranges    Desirable <200 mg/dL  Borderline 200-239 mg/dL  High >=240 mg/dL   TRIGLYCERIDES  <150 mg/dL 153 High    HDL CHOLESTEROL  >40 mg/dL 53   NON-HDL CHOLESTEROL  <145 mg/dl 143   CHOL/HDL RATIO  <4.50 3.70   LDL CHOLESTEROL  <=130 mg/dL 112   VLDL CHOLESTEROL  <=30 mg/dL 31 High       Lab Results   Component Value Date    WBC 5.2 01/05/2023    HGB 13.7 01/05/2023    HCT 41.5 01/05/2023    MCV 92 01/05/2023     01/05/2023      Lab Results   Component Value Date    TSH 1.21 10/28/2021    11/13/2023  TSH  0.27 - 4.20 uIU/mL 0.69          Santiago Marquez MD [facility]  Non-Invasive Cardiologist  [facility]  Pager [phone]

## 2024-06-12 ENCOUNTER — HOSPITAL ENCOUNTER (OUTPATIENT)
Dept: CARDIOLOGY | Facility: HOSPITAL | Age: 69
Discharge: HOME | End: 2024-06-12
Attending: GENERAL ACUTE CARE HOSPITAL | Admitting: GENERAL ACUTE CARE HOSPITAL
Payer: MEDICARE

## 2024-06-12 DIAGNOSIS — I47.10 PAROXYSMAL SUPRAVENTRICULAR TACHYCARDIA (H): ICD-10-CM

## 2024-06-12 DIAGNOSIS — I49.3 PVC'S (PREMATURE VENTRICULAR CONTRACTIONS): ICD-10-CM

## 2024-06-12 DIAGNOSIS — R00.2 PALPITATIONS: ICD-10-CM

## 2024-06-12 LAB — LVEF ECHO: NORMAL

## 2024-06-12 PROCEDURE — 93306 TTE W/DOPPLER COMPLETE: CPT

## 2024-06-12 PROCEDURE — 93306 TTE W/DOPPLER COMPLETE: CPT | Mod: 26 | Performed by: INTERNAL MEDICINE

## 2024-08-24 ENCOUNTER — HEALTH MAINTENANCE LETTER (OUTPATIENT)
Age: 69
End: 2024-08-24

## 2025-01-14 DIAGNOSIS — R76.8 CYCLIC CITRULLINATED PEPTIDE (CCP) ANTIBODY POSITIVE: ICD-10-CM

## 2025-01-14 DIAGNOSIS — M35.00 SICCA SYNDROME: ICD-10-CM

## 2025-01-20 ENCOUNTER — TELEPHONE (OUTPATIENT)
Dept: RHEUMATOLOGY | Facility: CLINIC | Age: 70
End: 2025-01-20
Payer: MEDICARE

## 2025-01-20 RX ORDER — CEVIMELINE HYDROCHLORIDE 30 MG/1
30 CAPSULE ORAL 3 TIMES DAILY
Qty: 90 CAPSULE | Refills: 0 | Status: SHIPPED | OUTPATIENT
Start: 2025-01-20

## 2025-01-20 NOTE — TELEPHONE ENCOUNTER
1/20/2025 2:27PM  Left Voicemail (1st Attempt) and Sent Mychart (1st Attempt) for the patient to call back and schedule the following:    Appointment type: New Rheumatology  Provider: Any Rheumatology provider  Return date: Next available  Specialty phone number: [phone]  Additional appointment(s) needed: NA  Additonal Notes: 1/20 LVM and MYC for pt to schedule New Rheumatology w/ another provider since DR. Chatman no longer available- needed to refill prescriptions. CLEMENT [facility]   Rheumatology, Gastroenterology, Nephrology, Infectious Disease, Pulmonology  [facility] and [facility]

## 2025-01-20 NOTE — TELEPHONE ENCOUNTER
Last Written Prescription:      Disp Refills Start End BRITTNEY    cevimeline (EVOXAC) 30 MG capsule 270 capsule 3 1/2/2024 -- No   Sig - Route: Take 1 capsule (30 mg) by mouth 3 times daily - Oral     ----------------------  Last Visit Date:   1/2/2024  [facility] Garcia Moore MD  Rheumatology    Future Visit Date: none  RTC: 1 yr  ----------------------  LOV note: Continue cevimeline 30 mg 3-4 times a day for sicca symptoms     Refill decision: Medication refilled per  Medication Refill in Ambulatory Care  policy.         [x]  Supervision: no future appointment scheduled. Scheduling has been notified to contact the pt for appointment.      Refill decision: Medication unable to be refilled by RN due to criteria not met as indicated below:         [x]  Pt not seen within past 12 months, no future appointment    []  Pt not seen within past 12 months; >30 days before next appointment    []  Compliance - lapse in therapy/gap in refills; No Shows; Cancellations    []  Verification - order discrepancy, clarification needed, Sig modification needed    []  Controlled medication    []  Medication not included in refill protocol policy    []  Abnormal labs/test:    []  Overdue labs/test:      []  Medication not active on Pt's med list    []  Drug interaction Warning    []  Medication - Last script is Reported/Historical/Transitional    []  Advanced refill request    []  Review Needed: New med; Med adjusted within <= 30 days; Safety Alert; Lab monitoring required    [x]  Other: prescribing provider has left    Request from pharmacy:  Requested Prescriptions   Pending Prescriptions Disp Refills    cevimeline (EVOXAC) 30 MG capsule 270 capsule 3     Sig: Take 1 capsule (30 mg) by mouth 3 times daily.       There is no refill protocol information for this order

## 2025-01-27 ENCOUNTER — MYC MEDICAL ADVICE (OUTPATIENT)
Dept: RHEUMATOLOGY | Facility: CLINIC | Age: 70
End: 2025-01-27
Payer: MEDICARE

## 2025-01-27 DIAGNOSIS — M35.00 SICCA SYNDROME: ICD-10-CM

## 2025-01-27 DIAGNOSIS — R76.8 CYCLIC CITRULLINATED PEPTIDE (CCP) ANTIBODY POSITIVE: ICD-10-CM

## 2025-01-28 RX ORDER — CEVIMELINE HYDROCHLORIDE 30 MG/1
30 CAPSULE ORAL 3 TIMES DAILY
Qty: 90 CAPSULE | Refills: 3 | OUTPATIENT
Start: 2025-01-28

## 2025-01-28 NOTE — TELEPHONE ENCOUNTER
Yes. It s Cevimeline HCL 30MG capsule. I take 1 capsule 3x a day. I need enough refills to get to mid May. ( my dr appointment is May 8 and it usually takes several days after that for the pharmacy to have it in to fill the order) Thank you for your help.     Last Written Prescription:1/20/25     cevimeline (EVOXAC) 30 MG capsule 90 capsule 0 1/20/2025 -- No   Sig - Route: Take 1 capsule (30 mg) by mouth 3 times daily. - Oral     ----------------------  Last Visit Date: Compa 1/2/24  Future Visit Date:  Patient reports that she does have a future appt scheduled, but it's scheduled through [facility] (she's actually scheduled with Dr. Chatman). Her appt is set up for 5/8/25,   ----------------------       Refill decision: Medication unable to be refilled by RN due to:       [x]    Pt not seen within past 12 months, last appt was with Dr Chatman( now at [facility])       [x]    Other: requesting mary refill through next scheduled outside appt with Dr Chatman 5/8/25      Request from pharmacy:  Requested Prescriptions   Pending Prescriptions Disp Refills    cevimeline (EVOXAC) 30 MG capsule 90 capsule 5     Sig: Take 1 capsule (30 mg) by mouth 3 times daily.       There is no refill protocol information for this order

## 2025-01-28 NOTE — TELEPHONE ENCOUNTER
Clarified the request of what patient is asking for, 3 month supply of medication.  Sent to Dr. Holly in the original encounter from 1/14/25,  and it was approved.  Will close encounter.    Ana Jeffries RN  Adult Rheumatology

## 2025-04-28 DIAGNOSIS — R76.8 CYCLIC CITRULLINATED PEPTIDE (CCP) ANTIBODY POSITIVE: ICD-10-CM

## 2025-04-28 DIAGNOSIS — M35.00 SICCA SYNDROME: ICD-10-CM

## 2025-04-30 NOTE — TELEPHONE ENCOUNTER
Last Written Prescription:  cevimeline (EVOXAC) 30 MG capsule  30 mg, 3 TIMES DAILY           Summary: Take 1 capsule (30 mg) by mouth 3 times daily., Disp-270 capsule, R-0, E-Prescribe Future refill requests should be sent to Dr. Garcia Chatman at [facility]   Dose, Route, Frequency: 30 mg, Oral, 3 TIMES DAILYStart: 01/28/2025Ord/Sold: 01/28/2025 (O)Ordered On: 01/28/2025Ph[id number] [place] [facility] [place] [facility] [address] [facility]: Taking: Long-term: Med Note:              Patient Sig: Take 1 capsule (30 mg) by mouth 3 times daily.  Ordering Department:  RHEUM  Authorized By: Clari Holly MD  Dispense: 270 capsule  Refills: 0 ordered       ----------------------  Last Visit Date: 01/02/2024 Virtual Visit Rheumatology - VANITA Chatman      Future Visit Date: NONE  ----------------------      Refill decision: Medication refilled per  Medication Refill in Ambulatory Care  policy.   []  If no future appointment scheduled: Route to Clinic Coordinators to contact the pt for appointment.      Refill decision: Medication unable to be refilled by RN due to: Pt not seen within past 12 months, No FOV or FOV exceeds timeframe per protocol      A one-time only 90-day mary refill was already given, pended to care team for review. Per telephone note 1/20/25, Patient has scheduled upcoming appt 5/8/25 with Sergey Guidry.     Request from pharmacy:  Requested Prescriptions   Pending Prescriptions Disp Refills    cevimeline (EVOXAC) 30 MG capsule [Pharmacy Med Name: CEVIMELINE HCL 30 MG CAPSULE] 270 capsule 0     Sig: TAKE 1 CAPSULE (30 MG) BY MOUTH 3 TIMES DAILY.       There is no refill protocol information for this order      Janette JUDGE RN  P Central Nursing/[facility] Triage & Med Refill Team

## 2025-05-01 RX ORDER — CEVIMELINE HYDROCHLORIDE 30 MG/1
30 CAPSULE ORAL 3 TIMES DAILY
Qty: 90 CAPSULE | OUTPATIENT
Start: 2025-05-01

## 2025-07-27 ENCOUNTER — HEALTH MAINTENANCE LETTER (OUTPATIENT)
Age: 70
End: 2025-07-27